# Patient Record
Sex: FEMALE | Race: WHITE | Employment: FULL TIME | URBAN - METROPOLITAN AREA
[De-identification: names, ages, dates, MRNs, and addresses within clinical notes are randomized per-mention and may not be internally consistent; named-entity substitution may affect disease eponyms.]

---

## 2017-01-18 RX ORDER — METOPROLOL SUCCINATE 25 MG/1
TABLET, EXTENDED RELEASE ORAL
Qty: 30 TAB | Refills: 0 | Status: SHIPPED | OUTPATIENT
Start: 2017-01-18 | End: 2017-02-20 | Stop reason: SDUPTHER

## 2017-01-18 NOTE — TELEPHONE ENCOUNTER
Changing to Dr. Sebastian Valverde, has appt Fri 1-20-17. Further refills by Dr. Sebastian Valverde at that time.

## 2017-01-20 ENCOUNTER — OFFICE VISIT (OUTPATIENT)
Dept: CARDIOLOGY CLINIC | Age: 52
End: 2017-01-20

## 2017-01-20 VITALS
HEIGHT: 63 IN | WEIGHT: 217.8 LBS | BODY MASS INDEX: 38.59 KG/M2 | DIASTOLIC BLOOD PRESSURE: 70 MMHG | SYSTOLIC BLOOD PRESSURE: 126 MMHG | OXYGEN SATURATION: 93 % | HEART RATE: 74 BPM | RESPIRATION RATE: 18 BRPM

## 2017-01-20 DIAGNOSIS — E78.2 MIXED HYPERLIPIDEMIA: ICD-10-CM

## 2017-01-20 DIAGNOSIS — I10 ESSENTIAL HYPERTENSION, BENIGN: ICD-10-CM

## 2017-01-20 DIAGNOSIS — I25.9 CHRONIC ISCHEMIC HEART DISEASE: Primary | ICD-10-CM

## 2017-01-20 DIAGNOSIS — Z95.1 S/P CABG X 4: ICD-10-CM

## 2017-01-20 DIAGNOSIS — I25.119 ATHEROSCLEROSIS OF NATIVE CORONARY ARTERY OF NATIVE HEART WITH ANGINA PECTORIS (HCC): ICD-10-CM

## 2017-01-20 DIAGNOSIS — E11.65 TYPE 2 DIABETES MELLITUS WITH HYPERGLYCEMIA, UNSPECIFIED LONG TERM INSULIN USE STATUS: ICD-10-CM

## 2017-01-20 NOTE — MR AVS SNAPSHOT
Visit Information Date & Time Provider Department Dept. Phone Encounter #  
 1/20/2017  3:30 PM Mikki Mckenna, [address] [place] [address] [facility] [id number] Your Appointments 1/25/2017  3:30 PM  
STRESS ECHOCARDIOGRAMS with STRES[facility], [facility] Lakisha Cerrato) Appt Note: Per Dr Luigi Brown $0CP REM 5'3\", 217. 12LBS. ECHO TTE STRESS EXRCSE COMP W OR WO CONTR [49652 Custom] [address]  
[phone] [address] [place] [address] Upcoming Health Maintenance Date Due Hepatitis C Screening 1965 FOOT EXAM Q1 7/7/1974 EYE EXAM RETINAL OR DILATED Q1 7/7/1974 Pneumococcal 19-64 Medium Risk (1 of 1 - PPSV23) 7/7/1983 DTaP/Tdap/Td series (1 - Tdap) 7/7/1985 PAP AKA CERVICAL CYTOLOGY 7/7/1985 BREAST CANCER SCRN MAMMOGRAM 7/7/2014 FOBT Q 1 YEAR AGE 50-75 7/7/2014 INFLUENZA AGE 9 TO ADULT 8/1/2016 HEMOGLOBIN A1C Q6M 10/14/2016 MICROALBUMIN Q1 1/20/2017 LIPID PANEL Q1 4/14/2017 Allergies as of 1/20/2017  Review Complete On: 1/20/2017 By: Sujata Phillips NP No Known Allergies Current Immunizations  Never Reviewed No immunizations on file. Not reviewed this visit You Were Diagnosed With   
  
 Codes Comments Chronic ischemic heart disease    -  Primary ICD-10-CM: I25.9 ICD-9-CM: 414.9 Mixed hyperlipidemia     ICD-10-CM: E78.2 ICD-9-CM: 272.2 Atherosclerosis of native coronary artery of native heart with angina pectoris ([facility] [address]     ICD-10-CM: I25.119 ICD-9-CM: 414.01, 413.9 Essential hypertension, benign     ICD-10-CM: I10 
ICD-9-CM: 401.1 Type 2 diabetes mellitus with hyperglycemia, unspecified long term insulin use status (HCC)     ICD-10-CM: E11.65 ICD-9-CM: 250.00 S/P CABG x 4     ICD-10-CM: Z95.1 ICD-9-CM: V45.81 Vitals BP Pulse Resp Height(growth percentile) Weight(growth percentile) SpO2 126/70 (BP 1 Location: Right arm, BP Patient Position: Sitting) 74 18 5' 3\" (1.6 m) 217 lb 12.8 oz (98.8 kg) 93% BMI Smoking Status 38.58 kg/m2 Never Smoker Vitals History BMI and BSA Data Body Mass Index Body Surface Area 38.58 kg/m 2 2.1 m 2 Preferred Pharmacy Pharmacy Name Phone [facility] [address] [place] [address]  Ne, [address] [place] [address]. [phone] Your Updated Medication List  
  
   
This list is accurate as of: 1/20/17  4:04 PM.  Always use your most recent med list.  
  
  
  
  
  insulin pump Misc Commonly known as:  PATIENT SUPPLIED  
by SubCUTAneous route as needed. amLODIPine 5 mg tablet Commonly known as:  David Alstrom TAKE ONE TABLET BY MOUTH EVERY DAY  
  
 aspirin 325 mg tablet Commonly known as:  ASPIRIN Take 325 mg by mouth daily. atorvastatin 20 mg tablet Commonly known as:  LIPITOR  
TAKE ONE TABLET BY MOUTH EVERY NIGHT AT BEDTIME  
  
 FREESTYLE TEST strip Generic drug:  glucose blood VI test strips  
  
 isosorbide mononitrate ER 30 mg tablet Commonly known as:  IMDUR  
TAKE ONE-HALF TABLET BY MOUTH DAILY  
  
 lisinopril 5 mg tablet Commonly known as:  Mariza Loss Take 5 mg by mouth daily. methIMAzole 10 mg tablet Commonly known as:  TAPAZOLE Take 10 mg by mouth daily. metoprolol succinate 25 mg XL tablet Commonly known as:  TOPROL-XL  
TAKE ONE TABLET BY MOUTH DAILY NovoLOG 100 unit/mL injection Generic drug:  insulin aspart We Performed the Following AMB POC EKG ROUTINE W/ 12 LEADS, INTER & REP [33918 CPT(R)] To-Do List   
 01/24/2017 ECHO:  ECHO TTE STRESS EXRCSE COMP W OR WO CONTR Introducing [facility]! [place] [employer] introduces io[employer] patient portal. Now you can access parts of your medical record, email your doctor's office, and request medication refills online.    
 
1. In your internet browser, go to [email or web address] 2. Click on the First Time User? Click Here link in the Sign In box. You will see the New Member Sign Up page. 3. Enter your [employer] Access Code exactly as it appears below. You will not need to use this code after youve completed the sign-up process. If you do not sign up before the expiration date, you must request a new code. · [employer] Access Code: [email or web address] Expires: 4/20/2017  3:23 PM 
 
4. Enter the last four digits of your Social Security Number (xxxx) and Date of Birth (mm/dd/yyyy) as indicated and click Submit. You will be taken to the next sign-up page. 5. Create a Zervantt ID. This will be your [employer] login ID and cannot be changed, so think of one that is secure and easy to remember. 6. Create a [employer] password. You can change your password at any time. 7. Enter your Password Reset Question and Answer. This can be used at a later time if you forget your password. 8. Enter your e-mail address. You will receive e-mail notification when new information is available in [address] 9. Click Sign Up. You can now view and download portions of your medical record. 10. Click the Download Summary menu link to download a portable copy of your medical information. If you have questions, please visit the Frequently Asked Questions section of the [employer] website. Remember, [employer] is NOT to be used for urgent needs. For medical emergencies, dial 911. Now available from your iPhone and Android! Please provide this summary of care documentation to your next provider. Your primary care clinician is listed as APOLINAR Coats 47. If you have any questions after today's visit, please call [phone].

## 2017-01-20 NOTE — PROGRESS NOTES
Redd Shepherd NP  Subjective/HPI:     Yonny Garvin is a 45 y.o. female is here for routine f/u. The patient denies resting chest pain/resting shortness of breath, orthopnea, PND, LE edema, palpitations, syncope, presyncope or fatigue. She is a 62-year-old female with a history of CABG ×4 in 2004, insulin-dependent diabetes, hypertension, hyperlipidemia. She reports for her usual follow-up and review of systems she does report some dyspnea on exertion with walking and hiking. Last evaluation for ischemia 2011. PCP Provider  Rosita Ceja MD  Past Medical History   Diagnosis Date    Chronic kidney disease      elevated potassiums    Coronary atherosclerosis of native coronary artery     Coronary atherosclerosis of native coronary artery     Diabetes ([address]     Essential hypertension     Essential hypertension, benign     Heart failure ([address]      bypass 6yrs ago    Hypertension     Morbid obesity ([address]     Nausea & vomiting     Thyroid disease       Past Surgical History   Procedure Laterality Date    Hx tubal ligation      Hx orthopaedic       shoulder surg    Pr cabg, artery-vein, four      Hx heart catheterization       No Known Allergies   History reviewed. No pertinent family history. Current Outpatient Prescriptions   Medication Sig    metoprolol succinate (TOPROL-XL) 25 mg XL tablet TAKE ONE TABLET BY MOUTH DAILY    isosorbide mononitrate ER (IMDUR) 30 mg tablet TAKE ONE-HALF TABLET BY MOUTH DAILY    atorvastatin (LIPITOR) 20 mg tablet TAKE ONE TABLET BY MOUTH EVERY NIGHT AT BEDTIME    FREESTYLE TEST strip     NOVOLOG 100 unit/mL injection     amLODIPine (NORVASC) 5 mg tablet TAKE ONE TABLET BY MOUTH EVERY DAY     insulin pump (PATIENT SUPPLIED) Misc by SubCUTAneous route as needed.  aspirin (ASPIRIN) 325 mg tablet Take 325 mg by mouth daily.  methimazole (TAPAZOLE) 10 mg tablet Take 10 mg by mouth daily.     lisinopril (PRINIVIL, ZESTRIL) 5 mg tablet Take 5 mg by mouth daily. No current facility-administered medications for this visit. Vitals:    01/20/17 1527 01/20/17 1534   BP: 130/70 126/70   Pulse: 74    Resp: 18    SpO2: 93%    Weight: 217 lb 12.8 oz (98.8 kg)    Height: 5' 3\" (1.6 m)      Social History     Social History    Marital status:      Spouse name: N/A    Number of children: N/A    Years of education: N/A     Occupational History    Not on file. Social History Main Topics    Smoking status: Never Smoker    Smokeless tobacco: Never Used    Alcohol use 0.0 oz/week     0 Standard drinks or equivalent per week      Comment: occaisionally    Drug use: Not on file    Sexual activity: Not on file     Other Topics Concern    Not on file     Social History Narrative       I have reviewed the nurses notes, vitals, problem list, allergy list, medical history, family, social history and medications. Review of Symptoms:    General: Pt denies excessive weight gain or loss. Pt is able to conduct ADL's  HEENT: Denies blurred vision, headaches, epistaxis and difficulty swallowing. Respiratory: Denies shortness of breath, + denies MOTTA, wheezing or stridor. Cardiovascular: Denies precordial pain, palpitations, edema or PND  Gastrointestinal: Denies poor appetite, indigestion, abdominal pain or blood in stool  Musculoskeletal: Denies pain or swelling from muscles or joints  Neurologic: Denies tremor, paresthesias, or sensory motor disturbance  Skin: Denies rash, itching or texture change. Physical Exam:      General: Well developed, in no acute distress, cooperative and alert  HEENT: No carotid bruits, no JVD, trach is midline. Neck Supple, PEERL, EOM intact. Heart:  Normal S1/S2 negative S3 or S4. Regular, no murmur, gallop or rub.   Respiratory: Clear bilaterally x 4, no wheezing or rales  Abdomen:   Soft, non-tender, no masses, bowel sounds are active.   Extremities:  No edema, normal cap refill, no cyanosis, atraumatic.    Neuro: A&Ox3, speech clear, gait stable. Skin: Skin color is normal. No rashes or lesions. Non diaphoretic  Vascular: 2+ pulses symmetric in all extremities    Cardiographics    ECG: Sinus rhythm  No results found for this or any previous visit. Cardiology Labs:  No results found for: CHOL, CHOLX, CHLST, [address], [id number], HDL, LDL, DLDL, LDLC, DLDLP, TGL, [facility], [id number], TRIGP, CHHD, CHHDX    Lab Results   Component Value Date/Time    Sodium 134 07/10/2010 03:34 AM    Potassium 5.3 07/10/2010 03:34 AM    Chloride 102 07/10/2010 03:34 AM    CO2 26 07/10/2010 03:34 AM    Anion gap 6 07/10/2010 03:34 AM    Glucose 300 07/10/2010 03:34 AM    BUN 30 07/10/2010 03:34 AM    Creatinine 1.5 07/10/2010 03:34 AM    BUN/Creatinine ratio 20 07/10/2010 03:34 AM    GFR est AA 48 07/10/2010 03:34 AM    GFR est non-AA 40 07/10/2010 03:34 AM    Calcium 8.4 07/10/2010 03:34 AM    Bilirubin, total 0.5 06/24/2010 10:30 AM    ALT 30 06/24/2010 10:30 AM    AST 14 06/24/2010 10:30 AM    Alk. phosphatase 196 06/24/2010 10:30 AM    Protein, total 7.4 06/24/2010 10:30 AM    Albumin 3.5 06/24/2010 10:30 AM    Globulin 3.9 06/24/2010 10:30 AM    A-G Ratio 0.9 06/24/2010 10:30 AM           Assessment:     Assessment:     [facility] was seen today for cholesterol problem.     Diagnoses and all orders for this visit:    Chronic ischemic heart disease  -     ECHO TTE STRESS EXRCSE COMP W OR WO CONTR; Future    Mixed hyperlipidemia  -     AMB POC EKG ROUTINE W/ 12 LEADS, INTER & REP  -     ECHO TTE STRESS EXRCSE COMP W OR WO CONTR; Future    Atherosclerosis of native coronary artery of native heart with angina pectoris (HCC)  -     ECHO TTE STRESS EXRCSE COMP W OR WO CONTR; Future    Essential hypertension, benign  -     ECHO TTE STRESS EXRCSE COMP W OR WO CONTR; Future    Type 2 diabetes mellitus with hyperglycemia, unspecified long term insulin use status (HCC)  -     ECHO TTE STRESS EXRCSE COMP W OR WO CONTR; Future    S/P CABG x 4  -     ECHO TTE STRESS EXRCSE COMP W OR WO CONTR; Future        ICD-10-CM ICD-9-CM    1. Chronic ischemic heart disease I25.9 414.9 ECHO TTE STRESS EXRCSE COMP W OR WO CONTR   2. Mixed hyperlipidemia E78.2 272.2 AMB POC EKG ROUTINE W/ 12 LEADS, INTER & REP      ECHO TTE STRESS EXRCSE COMP W OR WO CONTR   3. Atherosclerosis of native coronary artery of native heart with angina pectoris ([facility] [address] I25.119 414.01 ECHO TTE STRESS EXRCSE COMP W OR WO CONTR     413.9    4. Essential hypertension, benign I10 401.1 ECHO TTE STRESS EXRCSE COMP W OR WO CONTR   5. Type 2 diabetes mellitus with hyperglycemia, unspecified long term insulin use status (HCC) E11.65 250.00 ECHO TTE STRESS EXRCSE COMP W OR WO CONTR   6. S/P CABG x 4 Z95.1 V45.81 ECHO TTE STRESS EXRCSE COMP W OR WO CONTR     Orders Placed This Encounter    AMB POC EKG ROUTINE W/ 12 LEADS, INTER & REP     Order Specific Question:   Reason for Exam:     Answer:   routine    ECHO TTE STRESS EXRCSE COMP W OR WO CONTR     Standing Status:   Future     Standing Expiration Date:   7/20/2017     Order Specific Question:   Reason for Exam:     Answer:   MOTTA     Order Specific Question:   Contrast Enhancement (Bubble Study, Definity, Optison) may be used if criteria listed in established evidence-based protocol has been identified. Answer:   Yes        Plan:     1. Atherosclerotic heart disease/CABGx 4  2004: Mild dyspnea on exertion will rule out ischemia with stress echo. Patient is on triple antianginal therapy. 2.  Hypertension: Controlled with current therapy  3. Hyperlipidemia: Labs followed by endocrinology LDL at target continue statin therapy  Follow-up 6 months unless abnormal stress test.  Encourage patient to focus on diet exercise and weight loss.     Aan Cornejo MD

## 2017-01-25 ENCOUNTER — CLINICAL SUPPORT (OUTPATIENT)
Dept: CARDIOLOGY CLINIC | Age: 52
End: 2017-01-25

## 2017-01-25 DIAGNOSIS — I10 ESSENTIAL HYPERTENSION, BENIGN: ICD-10-CM

## 2017-01-25 DIAGNOSIS — E78.2 MIXED HYPERLIPIDEMIA: ICD-10-CM

## 2017-01-25 DIAGNOSIS — I25.119 ATHEROSCLEROSIS OF NATIVE CORONARY ARTERY OF NATIVE HEART WITH ANGINA PECTORIS (HCC): ICD-10-CM

## 2017-01-25 DIAGNOSIS — Z95.1 S/P CABG X 4: ICD-10-CM

## 2017-01-25 DIAGNOSIS — R07.9 CHEST PAIN, UNSPECIFIED: ICD-10-CM

## 2017-01-25 DIAGNOSIS — I25.9 CHRONIC ISCHEMIC HEART DISEASE: ICD-10-CM

## 2017-01-25 DIAGNOSIS — E11.65 TYPE 2 DIABETES MELLITUS WITH HYPERGLYCEMIA, UNSPECIFIED LONG TERM INSULIN USE STATUS: ICD-10-CM

## 2017-02-03 ENCOUNTER — TELEPHONE (OUTPATIENT)
Dept: CARDIOLOGY CLINIC | Age: 52
End: 2017-02-03

## 2017-02-03 NOTE — PROGRESS NOTES
Please let her know her stress test is normal. If she is having concerning symptoms she would like to discuss she can f/u with Dr Raymundo Chacon

## 2017-02-03 NOTE — TELEPHONE ENCOUNTER
----- Message from Adam Boateng NP sent at 2/3/2017  9:32 AM EST -----  Please let her know her stress test is normal. If she is having concerning symptoms she would like to discuss she can f/u with Dr Janey Sellers

## 2017-02-03 NOTE — TELEPHONE ENCOUNTER
Notes Recorded by Abigail Huffman LPN on 5/1/4500 at 21:98 AM  Verified patient with two identifiers.  Patient aware stress test is normal. If she is having concerning symptoms she would like to discuss she can f/u with Dr Maribell Pool.  She verbalized understanding

## 2017-02-03 NOTE — PROGRESS NOTES
Verified patient with two identifiers. Patient aware stress test is normal. If she is having concerning symptoms she would like to discuss she can f/u with Dr Nancy Hays. She verbalized understanding.

## 2017-02-20 RX ORDER — ATORVASTATIN CALCIUM 20 MG/1
TABLET, FILM COATED ORAL
Qty: 90 TAB | Refills: 0 | Status: SHIPPED | OUTPATIENT
Start: 2017-02-20 | End: 2017-06-06 | Stop reason: SDUPTHER

## 2017-03-31 ENCOUNTER — TELEPHONE (OUTPATIENT)
Dept: CARDIOLOGY CLINIC | Age: 52
End: 2017-03-31

## 2017-03-31 NOTE — TELEPHONE ENCOUNTER
We received fax from [facility] stating patient will be having removal of teeth, bone graft and or dental implant under local anesthesia. Please advise of the following. 1. Any medical contraindications or recommendations with anticipated dental treatment? 2. Need for prophylactic antibiotics? 3. Does she need to be off aspirin prior to extractions?

## 2017-03-31 NOTE — TELEPHONE ENCOUNTER
She is cleared as low risk for cardiac complications, no prophylactic abx and can hold ASA 5-7 days before if surgeon prefers

## 2017-06-06 RX ORDER — ATORVASTATIN CALCIUM 20 MG/1
TABLET, FILM COATED ORAL
Qty: 30 TAB | Refills: 6 | Status: SHIPPED | OUTPATIENT
Start: 2017-06-06 | End: 2017-08-23 | Stop reason: SDUPTHER

## 2017-08-23 ENCOUNTER — OFFICE VISIT (OUTPATIENT)
Dept: CARDIOLOGY CLINIC | Age: 52
End: 2017-08-23

## 2017-08-23 VITALS
SYSTOLIC BLOOD PRESSURE: 102 MMHG | BODY MASS INDEX: 38.7 KG/M2 | HEIGHT: 63 IN | HEART RATE: 66 BPM | RESPIRATION RATE: 16 BRPM | WEIGHT: 218.4 LBS | OXYGEN SATURATION: 98 % | DIASTOLIC BLOOD PRESSURE: 60 MMHG

## 2017-08-23 DIAGNOSIS — I25.10 ATHEROSCLEROSIS OF NATIVE CORONARY ARTERY OF NATIVE HEART WITHOUT ANGINA PECTORIS: ICD-10-CM

## 2017-08-23 DIAGNOSIS — I25.9 CHRONIC ISCHEMIC HEART DISEASE: Primary | ICD-10-CM

## 2017-08-23 DIAGNOSIS — E11.8 TYPE 2 DIABETES MELLITUS WITH COMPLICATION, UNSPECIFIED LONG TERM INSULIN USE STATUS: ICD-10-CM

## 2017-08-23 DIAGNOSIS — I10 ESSENTIAL HYPERTENSION, BENIGN: ICD-10-CM

## 2017-08-23 DIAGNOSIS — E78.2 MIXED HYPERLIPIDEMIA: ICD-10-CM

## 2017-08-23 RX ORDER — METOPROLOL SUCCINATE 25 MG/1
TABLET, EXTENDED RELEASE ORAL
Qty: 90 TAB | Refills: 3 | Status: SHIPPED | OUTPATIENT
Start: 2017-08-23 | End: 2018-08-27 | Stop reason: SDUPTHER

## 2017-08-23 RX ORDER — ACETAMINOPHEN AND CODEINE PHOSPHATE 300; 30 MG/1; MG/1
TABLET ORAL
COMMUNITY
Start: 2017-07-04 | End: 2019-11-18

## 2017-08-23 RX ORDER — ISOSORBIDE MONONITRATE 30 MG/1
TABLET, EXTENDED RELEASE ORAL
Qty: 45 TAB | Refills: 3 | Status: SHIPPED | OUTPATIENT
Start: 2017-08-23 | End: 2018-09-13 | Stop reason: SDUPTHER

## 2017-08-23 RX ORDER — ATORVASTATIN CALCIUM 20 MG/1
TABLET, FILM COATED ORAL
Qty: 90 TAB | Refills: 3 | Status: SHIPPED | OUTPATIENT
Start: 2017-08-23

## 2017-08-23 RX ORDER — AMLODIPINE BESYLATE 5 MG/1
TABLET ORAL
Qty: 90 TAB | Refills: 3 | Status: SHIPPED | OUTPATIENT
Start: 2017-08-23 | End: 2018-08-27 | Stop reason: SDUPTHER

## 2017-08-23 NOTE — PROGRESS NOTES
Subjective/HPI:     Mariana Rodriguez is a 47 y.o. female is here for f/u appt. She reports she still will get winded when she goes hiking. No worse. Doesn't notice it with normal level activity. The patient denies chest pain, orthopnea, PND, LE edema, palpitations, syncope, presyncope or fatigue. PCP Provider  Shana Lomeli MD  Past Medical History:   Diagnosis Date    Chronic kidney disease     elevated potassiums    Coronary atherosclerosis of native coronary artery     Coronary atherosclerosis of native coronary artery     Diabetes ([facility] [address]     Essential hypertension     Essential hypertension, benign     Heart failure ([facility] [address]     bypass 6yrs ago    Hypertension     Morbid obesity ([facility] [address]     Nausea & vomiting     Thyroid disease       Past Surgical History:   Procedure Laterality Date    CABG, ARTERY-VEIN, FOUR      HX HEART CATHETERIZATION      HX ORTHOPAEDIC      shoulder surg    HX TUBAL LIGATION       No Known Allergies   No family history on file. Current Outpatient Prescriptions   Medication Sig    isosorbide mononitrate ER (IMDUR) 30 mg tablet TAKE ONE-HALF TABLET BY MOUTH DAILY    atorvastatin (LIPITOR) 20 mg tablet TAKE ONE TABLET BY MOUTH EVERY NIGHT AT BEDTIME    metoprolol succinate (TOPROL-XL) 25 mg XL tablet TAKE ONE TABLET BY MOUTH DAILY, FOR MORE REFILLS CONTACT .  amLODIPine (NORVASC) 5 mg tablet TAKE ONE TABLET BY MOUTH EVERY DAY    FREESTYLE TEST strip     NOVOLOG 100 unit/mL injection      insulin pump (PATIENT SUPPLIED) Misc by SubCUTAneous route as needed.  aspirin (ASPIRIN) 325 mg tablet Take 325 mg by mouth daily.  methimazole (TAPAZOLE) 10 mg tablet Take 10 mg by mouth daily.  lisinopril (PRINIVIL, ZESTRIL) 5 mg tablet Take 5 mg by mouth daily.  acetaminophen-codeine (TYLENOL #3) 300-30 mg per tablet      No current facility-administered medications for this visit.        Vitals:    08/23/17 1136 08/23/17 1143   BP: 96/64 102/60   Pulse: 66    Resp: 16    SpO2: 98%    Weight: 218 lb 6.4 oz (99.1 kg)    Height: 5' 3\" (1.6 m)      Social History     Social History    Marital status:      Spouse name: N/A    Number of children: N/A    Years of education: N/A     Occupational History    Not on file. Social History Main Topics    Smoking status: Never Smoker    Smokeless tobacco: Never Used    Alcohol use 0.0 oz/week     0 Standard drinks or equivalent per week      Comment: occaisionally    Drug use: Not on file    Sexual activity: Not on file     Other Topics Concern    Not on file     Social History Narrative       I have reviewed the nurses notes, vitals, problem list, allergy list, medical history, family, social history and medications. Review of Symptoms:    General: Pt denies excessive weight gain or loss. Pt is able to conduct ADL's  HEENT: Denies blurred vision, headaches, epistaxis and difficulty swallowing. Respiratory: Denies worsening shortness of breath, MOTTA, wheezing or stridor. Cardiovascular: Denies precordial pain, palpitations, edema or PND  Gastrointestinal: Denies poor appetite, indigestion, abdominal pain or blood in stool  Urinary: Denies dysuria, pyuria  Musculoskeletal: Denies pain or swelling from muscles or joints  Neurologic: Denies tremor, paresthesias, or sensory motor disturbance  Skin: Denies rash, itching or texture change. Psych: Denies depression        Physical Exam:      General: Well developed, in no acute distress, cooperative and alert  HEENT: No carotid bruits, no JVD, trach is midline. Neck Supple, PEERL, EOM intact. Heart:  Normal S1/S2 negative S3 or S4. Regular, no murmur, gallop or rub.   Respiratory: Clear bilaterally x 4, no wheezing or rales  Abdomen:   Soft, non-tender, no masses, bowel sounds are active.   Extremities:  No edema, normal cap refill, no cyanosis, atraumatic. Neuro: A&Ox3, speech clear, gait stable.    Skin: Skin color is normal. No rashes or lesions. Non diaphoretic  Vascular: 2+ pulses symmetric in all extremities    Cardiographics    ECG: Sinus  Bradycardia HR 59  Nonspecific T-abnormality. Low voltage         Cardiology Labs:  No results found for: CHOL, CHOLX, CHLST, CHOLV, 349774, HDL, LDL, LDLC, DLDLP, TGLX, TRIGL, TRIGP, CHHD, CHHDX    Lab Results   Component Value Date/Time    Sodium 134 07/10/2010 03:34 AM    Potassium 5.3 07/10/2010 03:34 AM    Chloride 102 07/10/2010 03:34 AM    CO2 26 07/10/2010 03:34 AM    Anion gap 6 07/10/2010 03:34 AM    Glucose 300 07/10/2010 03:34 AM    BUN 30 07/10/2010 03:34 AM    Creatinine 1.5 07/10/2010 03:34 AM    BUN/Creatinine ratio 20 07/10/2010 03:34 AM    GFR est AA 48 07/10/2010 03:34 AM    GFR est non-AA 40 07/10/2010 03:34 AM    Calcium 8.4 07/10/2010 03:34 AM    AST (SGOT) 14 06/24/2010 10:30 AM    Alk. phosphatase 196 06/24/2010 10:30 AM    Protein, total 7.4 06/24/2010 10:30 AM    Albumin 3.5 06/24/2010 10:30 AM    Globulin 3.9 06/24/2010 10:30 AM    A-G Ratio 0.9 06/24/2010 10:30 AM    ALT (SGPT) 30 06/24/2010 10:30 AM           Assessment:     Assessment:     Diagnoses and all orders for this visit:    1. Chronic ischemic heart disease  -     isosorbide mononitrate ER (IMDUR) 30 mg tablet; TAKE ONE-HALF TABLET BY MOUTH DAILY  -     metoprolol succinate (TOPROL-XL) 25 mg XL tablet; TAKE ONE TABLET BY MOUTH DAILY, FOR MORE REFILLS CONTACT . 2. Atherosclerosis of native coronary artery of native heart without angina pectoris  -     metoprolol succinate (TOPROL-XL) 25 mg XL tablet; TAKE ONE TABLET BY MOUTH DAILY, FOR MORE REFILLS CONTACT . 3. Mixed hyperlipidemia  -     AMB POC EKG ROUTINE W/ 12 LEADS, INTER & REP  -     atorvastatin (LIPITOR) 20 mg tablet; TAKE ONE TABLET BY MOUTH EVERY NIGHT AT BEDTIME    4. Essential hypertension, benign  -     amLODIPine (NORVASC) 5 mg tablet; TAKE ONE TABLET BY MOUTH EVERY DAY    5.  Type 2 diabetes mellitus with complication, unspecified long term insulin use status        ICD-10-CM ICD-9-CM    1. Chronic ischemic heart disease I25.9 414.9 isosorbide mononitrate ER (IMDUR) 30 mg tablet      metoprolol succinate (TOPROL-XL) 25 mg XL tablet   2. Atherosclerosis of native coronary artery of native heart without angina pectoris I25.10 414.01 metoprolol succinate (TOPROL-XL) 25 mg XL tablet   3. Mixed hyperlipidemia E78.2 272.2 AMB POC EKG ROUTINE W/ 12 LEADS, INTER & REP      atorvastatin (LIPITOR) 20 mg tablet   4. Essential hypertension, benign I10 401.1 amLODIPine (NORVASC) 5 mg tablet   5. Type 2 diabetes mellitus with complication, unspecified long term insulin use status E11.8 250.90      Orders Placed This Encounter    AMB POC EKG ROUTINE W/ 12 LEADS, INTER & REP     Order Specific Question:   Reason for Exam:     Answer:   Routine    acetaminophen-codeine (TYLENOL #3) 300-30 mg per tablet    isosorbide mononitrate ER (IMDUR) 30 mg tablet     Sig: TAKE ONE-HALF TABLET BY MOUTH DAILY     Dispense:  45 Tab     Refill:  3    atorvastatin (LIPITOR) 20 mg tablet     Sig: TAKE ONE TABLET BY MOUTH EVERY NIGHT AT BEDTIME     Dispense:  90 Tab     Refill:  3    metoprolol succinate (TOPROL-XL) 25 mg XL tablet     Sig: TAKE ONE TABLET BY MOUTH DAILY, FOR MORE REFILLS CONTACT . Dispense:  90 Tab     Refill:  3    amLODIPine (NORVASC) 5 mg tablet     Sig: TAKE ONE TABLET BY MOUTH EVERY DAY     Dispense:  90 Tab     Refill:  3        Plan:   1. Atherosclerotic heart disease/CABGx 4  2004: Mild dyspnea on exertion at her baseline with neg stress echo. Patient is on triple antianginal therapy. Call back should it worsen to make an appt to discuss  2. Hypertension: Lower today, pt asymptomatic. Continue with current therapy. Encourage patient to focus on diet exercise and weight loss.   3.  Hyperlipidemia: Labs followed by endocrinology LDL at target continue statin therapy  Follow-up 6 months     Maik Salamanca MD

## 2017-08-23 NOTE — PROGRESS NOTES
Chief Complaint   Patient presents with    Cholesterol Problem     6 mo f/u    Hypertension     \"    Coronary Artery Disease     \"

## 2018-03-02 ENCOUNTER — OFFICE VISIT (OUTPATIENT)
Dept: CARDIOLOGY CLINIC | Age: 53
End: 2018-03-02

## 2018-03-02 VITALS
HEIGHT: 63 IN | BODY MASS INDEX: 39.51 KG/M2 | RESPIRATION RATE: 16 BRPM | OXYGEN SATURATION: 97 % | HEART RATE: 70 BPM | WEIGHT: 223 LBS | SYSTOLIC BLOOD PRESSURE: 140 MMHG | DIASTOLIC BLOOD PRESSURE: 70 MMHG

## 2018-03-02 DIAGNOSIS — Z95.1 S/P CABG X 4: ICD-10-CM

## 2018-03-02 DIAGNOSIS — I25.10 ATHEROSCLEROSIS OF NATIVE CORONARY ARTERY OF NATIVE HEART WITHOUT ANGINA PECTORIS: Primary | ICD-10-CM

## 2018-03-02 DIAGNOSIS — E78.2 MIXED HYPERLIPIDEMIA: ICD-10-CM

## 2018-03-02 DIAGNOSIS — I10 ESSENTIAL HYPERTENSION, BENIGN: ICD-10-CM

## 2018-03-02 RX ORDER — GLUCOSAMINE SULFATE 1500 MG
POWDER IN PACKET (EA) ORAL DAILY
COMMUNITY

## 2018-03-02 NOTE — PROGRESS NOTES
Sarath Kiran MD          NAME:  Amber Flaherty   :   1965   MRN:   [id number]   PCP:  None           Subjective: The patient is a 47y.o. year old female  who returns for a routine follow-up. Since the last visit, patient reports no change in exercise tolerance, chest pain, edema, medication intolerance, palpitations, shortness of breath, PND/orthopnea wheezing, sputum, syncope, dizziness or light headedness. Doing well. Past Medical History:   Diagnosis Date    Chronic kidney disease     elevated potassiums    Coronary atherosclerosis of native coronary artery     Coronary atherosclerosis of native coronary artery     Diabetes ([facility]     Essential hypertension     Essential hypertension, benign     Heart failure (HCC)     bypass 6yrs ago    Hypertension     Morbid obesity ([facility]     Nausea & vomiting     Thyroid disease         ICD-10-CM ICD-9-CM    1. Atherosclerosis of native coronary artery of native heart without angina pectoris I25.10 414.01    2. Mixed hyperlipidemia E78.2 272.2 cholecalciferol (VITAMIN D3) 1,000 unit cap      AMB POC EKG ROUTINE W/ 12 LEADS, INTER & REP   3. S/P CABG x 4 Z95.1 V45.81    4. Essential hypertension, benign I10 401.1       Social History   Substance Use Topics    Smoking status: Never Smoker    Smokeless tobacco: Never Used    Alcohol use 0.0 oz/week     0 Standard drinks or equivalent per week      Comment: occaisionally      No family history on file. Review of Systems  Cardiovascular: Negative except as noted in HPI      Objective:       Vitals:    18 1513   BP: 140/70   Pulse: 70   Resp: 16   SpO2: 97%   Weight: 223 lb (101.2 kg)   Height: 5' 3\" (1.6 m)    Body mass index is 39.5 kg/(m^2). General PE  Mental Status - Alert. General Appearance - Not in acute distress. Chest and Lung Exam   Inspection: Accessory muscles - No use of accessory muscles in breathing.   Auscultation:   Breath sounds: - Normal.    Cardiovascular   Inspection: Jugular vein - Bilateral - Inspection Normal.  Palpation/Percussion:   Apical Impulse: - Normal.  Auscultation: Rhythm - Regular. Heart Sounds - S1 WNL and S2 WNL. No S3 or S4. Murmurs & Other Heart Sounds: Auscultation of the heart reveals - No Murmurs. Peripheral Vascular   Upper Extremity: Inspection - Bilateral - No Cyanotic nailbeds or Digital clubbing. Lower Extremity:   Palpation: Edema - Bilateral - No edema. Data Review:     EKG -  EKG: unchanged from previous tracings, normal sinus rhythm, nonspecific ST and T waves changes. LABS- @brieflabs@      Allergies reviewed  No Known Allergies    Medications reviewed  Current Outpatient Prescriptions   Medication Sig    cholecalciferol (VITAMIN D3) 1,000 unit cap Take  by mouth daily.  isosorbide mononitrate ER (IMDUR) 30 mg tablet TAKE ONE-HALF TABLET BY MOUTH DAILY    atorvastatin (LIPITOR) 20 mg tablet TAKE ONE TABLET BY MOUTH EVERY NIGHT AT BEDTIME    metoprolol succinate (TOPROL-XL) 25 mg XL tablet TAKE ONE TABLET BY MOUTH DAILY, FOR MORE REFILLS CONTACT .  amLODIPine (NORVASC) 5 mg tablet TAKE ONE TABLET BY MOUTH EVERY DAY    FREESTYLE TEST strip     NOVOLOG 100 unit/mL injection      insulin pump (PATIENT SUPPLIED) Misc by SubCUTAneous route as needed.  aspirin (ASPIRIN) 325 mg tablet Take 325 mg by mouth daily.  methimazole (TAPAZOLE) 10 mg tablet Take 10 mg by mouth daily.  lisinopril (PRINIVIL, ZESTRIL) 5 mg tablet Take 5 mg by mouth daily.  acetaminophen-codeine (TYLENOL #3) 300-30 mg per tablet      No current facility-administered medications for this visit. Assessment:       ICD-10-CM ICD-9-CM    1. Atherosclerosis of native coronary artery of native heart without angina pectoris I25.10 414.01    2. Mixed hyperlipidemia E78.2 272.2 cholecalciferol (VITAMIN D3) 1,000 unit cap      AMB POC EKG ROUTINE W/ 12 LEADS, INTER & REP   3.  S/P CABG x 4 Z95.1 V45.81    4. Essential hypertension, benign I10 401.1         Orders Placed This Encounter    AMB POC EKG ROUTINE W/ 12 LEADS, INTER & REP     Order Specific Question:   Reason for Exam:     Answer:   routine    cholecalciferol (VITAMIN D3) 1,000 unit cap     Sig: Take  by mouth daily. Plan:     No angina. MOTTA unchanged. 130/70 at home. Lipids per endo. EKG OK.   F/U 6 mo    Gildardo Farrell MD

## 2018-03-02 NOTE — PROGRESS NOTES
1. Have you been to the ER, urgent care clinic since your last visit? Hospitalized since your last visit? No    2. Have you seen or consulted any other health care providers outside of the [address] [place] [address] since your last visit? Include any pap smears or colon screening. Yes Dr Arndt Shall    Patient C/O fatigue and SOB with activity.

## 2018-08-27 DIAGNOSIS — I10 ESSENTIAL HYPERTENSION, BENIGN: ICD-10-CM

## 2018-08-27 DIAGNOSIS — I25.10 ATHEROSCLEROSIS OF NATIVE CORONARY ARTERY OF NATIVE HEART WITHOUT ANGINA PECTORIS: ICD-10-CM

## 2018-08-27 DIAGNOSIS — I25.9 CHRONIC ISCHEMIC HEART DISEASE: ICD-10-CM

## 2018-08-27 RX ORDER — AMLODIPINE BESYLATE 5 MG/1
TABLET ORAL
Qty: 90 TAB | Refills: 0 | Status: SHIPPED | OUTPATIENT
Start: 2018-08-27 | End: 2018-11-22 | Stop reason: SDUPTHER

## 2018-08-27 RX ORDER — METOPROLOL SUCCINATE 25 MG/1
TABLET, EXTENDED RELEASE ORAL
Qty: 90 TAB | Refills: 0 | Status: SHIPPED | OUTPATIENT
Start: 2018-08-27 | End: 2018-11-22 | Stop reason: SDUPTHER

## 2018-09-13 DIAGNOSIS — I25.9 CHRONIC ISCHEMIC HEART DISEASE: ICD-10-CM

## 2018-09-13 RX ORDER — ISOSORBIDE MONONITRATE 30 MG/1
TABLET, EXTENDED RELEASE ORAL
Qty: 45 TAB | Refills: 3 | Status: SHIPPED | OUTPATIENT
Start: 2018-09-13 | End: 2019-09-05 | Stop reason: SDUPTHER

## 2018-11-19 ENCOUNTER — OFFICE VISIT (OUTPATIENT)
Dept: CARDIOLOGY CLINIC | Age: 53
End: 2018-11-19

## 2018-11-19 VITALS
HEART RATE: 75 BPM | BODY MASS INDEX: 37.53 KG/M2 | OXYGEN SATURATION: 97 % | DIASTOLIC BLOOD PRESSURE: 72 MMHG | SYSTOLIC BLOOD PRESSURE: 122 MMHG | HEIGHT: 63 IN | RESPIRATION RATE: 16 BRPM | WEIGHT: 211.8 LBS

## 2018-11-19 DIAGNOSIS — I25.10 CORONARY ARTERY DISEASE INVOLVING NATIVE CORONARY ARTERY OF NATIVE HEART WITHOUT ANGINA PECTORIS: ICD-10-CM

## 2018-11-19 DIAGNOSIS — E78.2 MIXED HYPERLIPIDEMIA: ICD-10-CM

## 2018-11-19 DIAGNOSIS — I10 ESSENTIAL HYPERTENSION, BENIGN: ICD-10-CM

## 2018-11-19 DIAGNOSIS — I25.9 CHRONIC ISCHEMIC HEART DISEASE: Primary | ICD-10-CM

## 2018-11-19 DIAGNOSIS — Z95.1 S/P CABG X 4: ICD-10-CM

## 2018-11-19 PROBLEM — E66.01 SEVERE OBESITY (HCC): Status: ACTIVE | Noted: 2018-11-19

## 2018-11-19 RX ORDER — INSULIN LISPRO 100 [IU]/ML
INJECTION, SOLUTION INTRAVENOUS; SUBCUTANEOUS
COMMUNITY
Start: 2018-10-27

## 2018-11-19 NOTE — PROGRESS NOTES
Chief Complaint   Patient presents with    Cholesterol Problem     6 month follow up     1. Have you been to the ER, urgent care clinic since your last visit? Hospitalized since your last visit? No    2. Have you seen or consulted any other health care providers outside of the [facility] since your last visit? Include any pap smears or colon screening.  No

## 2018-11-20 NOTE — PROGRESS NOTES
Cl Lehman MD          NAME:  Medhat Donovan   :   1965   MRN:   [id number]   PCP:  None           Subjective: The patient is a 46y.o. year old female  who returns for a routine follow-up. Since the last visit, patient reports no change in exercise tolerance, chest pain, edema, medication intolerance, palpitations, shortness of breath, PND/orthopnea wheezing, sputum, syncope, dizziness or light headedness. Doing well. Past Medical History:   Diagnosis Date    Chronic kidney disease     elevated potassiums    Coronary atherosclerosis of native coronary artery     Coronary atherosclerosis of native coronary artery     Diabetes ([facility] [address] [facility]     Essential hypertension     Essential hypertension, benign     Heart failure (HCC)     bypass 6yrs ago    Hypertension     Morbid obesity ([facility] [address] [facility]     Nausea & vomiting     Thyroid disease         ICD-10-CM ICD-9-CM    1. Chronic ischemic heart disease I25.9 414.9 AMB POC EKG ROUTINE W/ 12 LEADS, INTER & REP   2. Essential hypertension, benign I10 401.1    3. Mixed hyperlipidemia E78.2 272.2    4. S/P CABG x 4 Z95.1 V45.81    5. Coronary artery disease involving native coronary artery of native heart without angina pectoris I25.10 414.01       Social History     Tobacco Use    Smoking status: Never Smoker    Smokeless tobacco: Never Used   Substance Use Topics    Alcohol use: Yes     Alcohol/week: 0.0 oz     Comment: occaisionally      History reviewed. No pertinent family history. Review of Systems  Cardiovascular: Negative except as noted in HPI      Objective:       Vitals:    18 1524 18 1533   BP: 122/68 122/72   Pulse: 75    Resp: 16    SpO2: 97%    Weight: 211 lb 12.8 oz (96.1 kg)    Height: 5' 3\" (1.6 m)     Body mass index is 37.52 kg/m². General PE  Mental Status - Alert. General Appearance - Not in acute distress.     Chest and Lung Exam   Inspection: Accessory muscles - No use of accessory muscles in breathing. Auscultation:   Breath sounds: - Normal.    Cardiovascular   Inspection: Jugular vein - Bilateral - Inspection Normal.  Palpation/Percussion:   Apical Impulse: - Normal.  Auscultation: Rhythm - Regular. Heart Sounds - S1 WNL and S2 WNL. No S3 or S4. Murmurs & Other Heart Sounds: Auscultation of the heart reveals - No Murmurs. Peripheral Vascular   Upper Extremity: Inspection - Bilateral - No Cyanotic nailbeds or Digital clubbing. Lower Extremity:   Palpation: Edema - Bilateral - No edema. Data Review:     EKG -  EKG: Sinus  Rhythm   -RSR(V1) -nondiagnostic.    -  Nonspecific T-abnormality. Low voltage -possible pulmonary disease. [facility] [employer] LABS- @brieflabs@      Allergies reviewed  No Known Allergies    Medications reviewed  Current Outpatient Medications   Medication Sig    isosorbide mononitrate ER (IMDUR) 30 mg tablet TAKE ONE-HALF TABLET BY MOUTH DAILY    metoprolol succinate (TOPROL-XL) 25 mg XL tablet TAKE ONE TABLET BY MOUTH DAILY, FOR MORE REFILLS CONTACT .  amLODIPine (NORVASC) 5 mg tablet TAKE ONE TABLET BY MOUTH EVERY DAY    cholecalciferol (VITAMIN D3) 1,000 unit cap Take  by mouth daily.  atorvastatin (LIPITOR) 20 mg tablet TAKE ONE TABLET BY MOUTH EVERY NIGHT AT BEDTIME    FREESTYLE TEST strip      insulin pump (PATIENT SUPPLIED) [facility] by SubCUTAneous route as needed.  aspirin (ASPIRIN) 325 mg tablet Take 325 mg by mouth daily.  methimazole (TAPAZOLE) 10 mg tablet Take 10 mg by mouth daily. Takes 20 mg mon,Tue,Wed,Thur,Fri and 10 mg Sat,Sun    lisinopril (PRINIVIL, ZESTRIL) 5 mg tablet Take 5 mg by mouth daily.  HUMALOG U-100 INSULIN 100 unit/mL injection     acetaminophen-codeine (TYLENOL #3) 300-30 mg per tablet     NOVOLOG 100 unit/mL injection      No current facility-administered medications for this visit. Assessment:       ICD-10-CM ICD-9-CM    1.  Chronic ischemic heart disease I25.9 414.9 AMB POC EKG ROUTINE W/ 12 LEADS, INTER & REP   2. Essential hypertension, benign I10 401.1    3. Mixed hyperlipidemia E78.2 272.2    4. S/P CABG x 4 Z95.1 V45.81    5. Coronary artery disease involving native coronary artery of native heart without angina pectoris I25.10 414.01         Orders Placed This Encounter    AMB POC EKG ROUTINE W/ 12 LEADS, INTER & REP     Order Specific Question:   Reason for Exam:     Answer:   routine    HUMALOG U-100 INSULIN 100 unit/mL injection       Plan:     No angina. BP at target. EKG nonishcemic. Lipids per PCP.   F/U 1 yr    Jonni Burkitt, MD

## 2018-11-22 DIAGNOSIS — I10 ESSENTIAL HYPERTENSION, BENIGN: ICD-10-CM

## 2018-11-22 DIAGNOSIS — I25.10 ATHEROSCLEROSIS OF NATIVE CORONARY ARTERY OF NATIVE HEART WITHOUT ANGINA PECTORIS: ICD-10-CM

## 2018-11-22 DIAGNOSIS — I25.9 CHRONIC ISCHEMIC HEART DISEASE: ICD-10-CM

## 2018-12-07 RX ORDER — AMLODIPINE BESYLATE 5 MG/1
TABLET ORAL
Qty: 90 TAB | Refills: 0 | Status: SHIPPED | OUTPATIENT
Start: 2018-12-07 | End: 2019-03-06 | Stop reason: SDUPTHER

## 2018-12-07 RX ORDER — METOPROLOL SUCCINATE 25 MG/1
TABLET, EXTENDED RELEASE ORAL
Qty: 90 TAB | Refills: 0 | Status: SHIPPED | OUTPATIENT
Start: 2018-12-07 | End: 2019-03-06 | Stop reason: SDUPTHER

## 2019-03-06 DIAGNOSIS — I25.9 CHRONIC ISCHEMIC HEART DISEASE: ICD-10-CM

## 2019-03-06 DIAGNOSIS — I25.10 ATHEROSCLEROSIS OF NATIVE CORONARY ARTERY OF NATIVE HEART WITHOUT ANGINA PECTORIS: ICD-10-CM

## 2019-03-06 DIAGNOSIS — I10 ESSENTIAL HYPERTENSION, BENIGN: ICD-10-CM

## 2019-03-07 RX ORDER — AMLODIPINE BESYLATE 5 MG/1
TABLET ORAL
Qty: 90 TAB | Refills: 0 | Status: SHIPPED | OUTPATIENT
Start: 2019-03-07 | End: 2019-06-03 | Stop reason: SDUPTHER

## 2019-03-07 RX ORDER — METOPROLOL SUCCINATE 25 MG/1
TABLET, EXTENDED RELEASE ORAL
Qty: 90 TAB | Refills: 0 | Status: SHIPPED | OUTPATIENT
Start: 2019-03-07 | End: 2019-06-03 | Stop reason: SDUPTHER

## 2019-03-11 NOTE — TELEPHONE ENCOUNTER
Dr HERRERA patient.
Now Dr Asaf Serrano pt.
Now sees Dr. Downs Sic
Refill request sent to NP for approval.
ER physician

## 2019-06-03 DIAGNOSIS — I25.9 CHRONIC ISCHEMIC HEART DISEASE: ICD-10-CM

## 2019-06-03 DIAGNOSIS — I10 ESSENTIAL HYPERTENSION, BENIGN: ICD-10-CM

## 2019-06-03 DIAGNOSIS — I25.10 ATHEROSCLEROSIS OF NATIVE CORONARY ARTERY OF NATIVE HEART WITHOUT ANGINA PECTORIS: ICD-10-CM

## 2019-06-03 RX ORDER — AMLODIPINE BESYLATE 5 MG/1
TABLET ORAL
Qty: 90 TAB | Refills: 0 | Status: SHIPPED | OUTPATIENT
Start: 2019-06-03 | End: 2019-08-31 | Stop reason: SDUPTHER

## 2019-06-03 RX ORDER — METOPROLOL SUCCINATE 25 MG/1
TABLET, EXTENDED RELEASE ORAL
Qty: 90 TAB | Refills: 0 | Status: SHIPPED | OUTPATIENT
Start: 2019-06-03 | End: 2019-08-31 | Stop reason: SDUPTHER

## 2019-08-31 DIAGNOSIS — I25.10 ATHEROSCLEROSIS OF NATIVE CORONARY ARTERY OF NATIVE HEART WITHOUT ANGINA PECTORIS: ICD-10-CM

## 2019-08-31 DIAGNOSIS — I25.9 CHRONIC ISCHEMIC HEART DISEASE: ICD-10-CM

## 2019-08-31 DIAGNOSIS — I10 ESSENTIAL HYPERTENSION, BENIGN: ICD-10-CM

## 2019-09-03 RX ORDER — AMLODIPINE BESYLATE 5 MG/1
TABLET ORAL
Qty: 90 TAB | Refills: 0 | Status: SHIPPED | OUTPATIENT
Start: 2019-09-03 | End: 2019-12-09 | Stop reason: SDUPTHER

## 2019-09-03 RX ORDER — METOPROLOL SUCCINATE 25 MG/1
TABLET, EXTENDED RELEASE ORAL
Qty: 90 TAB | Refills: 0 | Status: SHIPPED | OUTPATIENT
Start: 2019-09-03 | End: 2019-12-09 | Stop reason: SDUPTHER

## 2019-09-05 DIAGNOSIS — I25.9 CHRONIC ISCHEMIC HEART DISEASE: ICD-10-CM

## 2019-09-05 RX ORDER — ISOSORBIDE MONONITRATE 30 MG/1
TABLET, EXTENDED RELEASE ORAL
Qty: 45 TAB | Refills: 2 | Status: SHIPPED | OUTPATIENT
Start: 2019-09-05 | End: 2020-05-28

## 2019-11-18 ENCOUNTER — OFFICE VISIT (OUTPATIENT)
Dept: CARDIOLOGY CLINIC | Age: 54
End: 2019-11-18

## 2019-11-18 VITALS
OXYGEN SATURATION: 98 % | RESPIRATION RATE: 16 BRPM | WEIGHT: 219.8 LBS | DIASTOLIC BLOOD PRESSURE: 70 MMHG | HEIGHT: 63 IN | SYSTOLIC BLOOD PRESSURE: 134 MMHG | BODY MASS INDEX: 38.95 KG/M2 | HEART RATE: 67 BPM

## 2019-11-18 DIAGNOSIS — I25.10 ATHEROSCLEROSIS OF NATIVE CORONARY ARTERY OF NATIVE HEART WITHOUT ANGINA PECTORIS: Primary | ICD-10-CM

## 2019-11-18 DIAGNOSIS — Z95.1 S/P CABG X 4: ICD-10-CM

## 2019-11-18 DIAGNOSIS — N18.2 CKD (CHRONIC KIDNEY DISEASE), STAGE II: ICD-10-CM

## 2019-11-18 DIAGNOSIS — I25.9 CHRONIC ISCHEMIC HEART DISEASE: ICD-10-CM

## 2019-11-18 DIAGNOSIS — I10 ESSENTIAL HYPERTENSION, BENIGN: ICD-10-CM

## 2019-11-18 DIAGNOSIS — E78.2 MIXED HYPERLIPIDEMIA: ICD-10-CM

## 2019-11-18 NOTE — PROGRESS NOTES
[address], [address] [facility]  [phone]     Subjective:      Penny Ortega is a 53 y.o. 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 Continues to follow with Dr Tg Mcmillan for her diabetes, has insulin pump  Also seeing Dr Cade Pompa for her kidneys. The patient denies chest pain/ shortness of breath, orthopnea, PND, LE edema, palpitations, syncope, or presyncope. Patient Active Problem List    Diagnosis Date Noted    Severe obesity ([facility] 11/19/2018    Coronary atherosclerosis of native coronary artery 11/22/2013    Chest pain, unspecified 03/27/2013    Mixed hyperlipidemia 07/30/2012    Essential hypertension, benign 07/30/2012    DM type 2 (diabetes mellitus, type 2) ([facility] [address] 07/30/2012    Chronic ischemic heart disease 07/30/2012    S/P CABG x 4 07/30/2012      None  Past Medical History:   Diagnosis Date    Chronic kidney disease     elevated potassiums    Coronary atherosclerosis of native coronary artery     Coronary atherosclerosis of native coronary artery     Diabetes ([facility] [address]     Essential hypertension     Essential hypertension, benign     Heart failure (HCC)     bypass 6yrs ago    Hypertension     Morbid obesity (HCC)     Nausea & vomiting     Thyroid disease       Past Surgical History:   Procedure Laterality Date    CABG, ARTERY-VEIN, FOUR      HX HEART CATHETERIZATION      HX ORTHOPAEDIC      shoulder surg    HX TUBAL LIGATION       No Known Allergies   No family history on file.    Social History     Socioeconomic History    Marital status:      Spouse name: Not on file    Number of children: Not on file    Years of education: Not on file    Highest education level: Not on file   Occupational History    Not on file   Social Needs    Financial resource strain: Not on file    Food insecurity:     Worry: Not on file     Inability: Not on file    Transportation needs:     Medical: Not on file     Non-medical: Not on file   Tobacco Use    Smoking status: Never Smoker    Smokeless tobacco: Never Used   Substance and Sexual Activity    Alcohol use: Yes     Alcohol/week: 0.0 standard drinks     Comment: occaisionally    Drug use: Not on file    Sexual activity: Not on file   Lifestyle    Physical activity:     Days per week: Not on file     Minutes per session: Not on file    Stress: Not on file   Relationships    Social connections:     Talks on phone: Not on file     Gets together: Not on file     Attends [employer] service: Not on file     Active member of club or organization: Not on file     Attends meetings of clubs or organizations: Not on file     Relationship status: Not on file    Intimate partner violence:     Fear of current or ex partner: Not on file     Emotionally abused: Not on file     Physically abused: Not on file     Forced sexual activity: Not on file   Other Topics Concern    Not on file   Social History Narrative    Not on file      Current Outpatient Medications   Medication Sig    isosorbide mononitrate ER (IMDUR) 30 mg tablet TAKE ONE-HALF TABLET DAILY    metoprolol succinate (TOPROL-XL) 25 mg XL tablet TAKE ONE TABLET BY MOUTH DAILY    amLODIPine (NORVASC) 5 mg tablet TAKE ONE TABLET BY MOUTH DAILY    HUMALOG U-100 INSULIN 100 unit/mL injection     cholecalciferol (VITAMIN D3) 1,000 unit cap Take  by mouth daily.  atorvastatin (LIPITOR) 20 mg tablet TAKE ONE TABLET BY MOUTH EVERY NIGHT AT BEDTIME    FREESTYLE TEST strip      insulin pump (PATIENT SUPPLIED) Misc by SubCUTAneous route as needed.  aspirin (ASPIRIN) 325 mg tablet Take 325 mg by mouth daily.  methimazole (TAPAZOLE) 10 mg tablet Take 10 mg by mouth daily. Takes 10 mg and 5 mg every other day    lisinopril (PRINIVIL, ZESTRIL) 5 mg tablet Take 5 mg by mouth daily. No current facility-administered medications for this visit. Review of Symptoms:  11 systems reviewed, negative other than as stated in the HPI    Physical ExamPhysical Exam:    Vitals:    11/18/19 1458 11/18/19 1509 11/18/19 1522   BP: 140/68 140/68 134/70   Pulse: 67     Resp: 16     SpO2: 98%     Weight: 219 lb 12.8 oz (99.7 kg)     Height: 5' 3\" (1.6 m)       Body mass index is 38.94 kg/m². General PE  Gen:  NAD  Mental Status - Alert. General Appearance - Not in acute distress. HEENT:  PERRL, no carotid bruits or JVD  Chest and Lung Exam   Inspection: Accessory muscles - No use of accessory muscles in breathing. Auscultation:   Breath sounds: - Normal.   Cardiovascular   Inspection: Jugular vein - Bilateral - Inspection Normal.   Palpation/Percussion:   Apical Impulse: - Normal.   Auscultation: Rhythm - Regular. Heart Sounds - S1 WNL and S2 WNL. No S3 or S4. Murmurs & Other Heart Sounds: Auscultation of the heart reveals - No Murmurs. Peripheral Vascular   Upper Extremity: Inspection - Bilateral - No Cyanotic nailbeds or Digital clubbing. Lower Extremity:   Palpation: Edema - Bilateral - No edema. Abdomen:   Soft, non-tender, bowel sounds are active. Neuro: A&O times 3, CN and motor grossly WNL    Labs:   No results found for: CHOL, CHOLX, CHLST, CHOLV, 358392, HDL, HDLP, LDL, LDLC, DLDLP, TGLX, TRIGL, TRIGP, CHHD, CHHDX  No results found for: CPK, CPKX, CPX  Lab Results   Component Value Date/Time    Sodium 134 (L) 07/10/2010 03:34 AM    Potassium 5.3 (H) 07/10/2010 03:34 AM    Chloride 102 07/10/2010 03:34 AM    CO2 26 07/10/2010 03:34 AM    Anion gap 6 07/10/2010 03:34 AM    Glucose 300 (H) 07/10/2010 03:34 AM    BUN 30 (H) 07/10/2010 03:34 AM    Creatinine 1.5 (H) 07/10/2010 03:34 AM    BUN/Creatinine ratio 20 07/10/2010 03:34 AM    GFR est AA 48 (L) 07/10/2010 03:34 AM    GFR est non-AA 40 (L) 07/10/2010 03:34 AM    Calcium 8.4 (L) 07/10/2010 03:34 AM    Bilirubin, total 0.5 06/24/2010 10:30 AM    AST (SGOT) 14 (L) 06/24/2010 10:30 AM    Alk. phosphatase 196 (H) 06/24/2010 10:30 AM    Protein, total 7.4 06/24/2010 10:30 AM    Albumin 3.5 06/24/2010 10:30 AM    Globulin 3.9 06/24/2010 10:30 AM    A-G Ratio 0.9 (L) 06/24/2010 10:30 AM    ALT (SGPT) 30 06/24/2010 10:30 AM       EKG:  SR     Assessment:     Assessment:      1. Atherosclerosis of native coronary artery of native heart without angina pectoris    2. Mixed hyperlipidemia    3. S/P CABG x 4    4. Essential hypertension, benign    5. CKD (chronic kidney disease), stage II    6. Chronic ischemic heart disease        Orders Placed This Encounter    AMB POC EKG ROUTINE W/ 12 LEADS, INTER & REP     Order Specific Question:   Reason for Exam:     Answer:   Routine        Plan:     Patient presents for f/u, doing well and stable from cardiac standpoint. 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 Continues to follow with Dr Perfecto Adhikari for her diabetes, has insulin pump  Also seeing Dr Annabel De Leon for her kidneys.     ASHD Hx CABG x 4 around 15 yrs ago  Normal stress echo in 2017  Continue ASA BB Imdur statin    HTN  Controlled with current therapy    HLD  On statin  Lipids and labs followed by Dr Perfecto Adhikari    DM  On insulin pump    CKD II  Followed by Dr Enma Myers  On low dose Ace-I for renovascular protection        Continue current care and f/u in 6 mos      Gurjit Lundy MD

## 2019-11-18 NOTE — PROGRESS NOTES
1. Have you been to the ER, urgent care clinic since your last visit? Hospitalized since your last visit? No    2. Have you seen or consulted any other health care providers outside of the [address] [place] [address] since your last visit? Include any pap smears or colon screening. No    Chief Complaint   Patient presents with    Cholesterol Problem     annual f/u    Hypertension     annual f/u     Pt denies cardiac complaints.

## 2019-12-09 DIAGNOSIS — I25.10 ATHEROSCLEROSIS OF NATIVE CORONARY ARTERY OF NATIVE HEART WITHOUT ANGINA PECTORIS: ICD-10-CM

## 2019-12-09 DIAGNOSIS — I10 ESSENTIAL HYPERTENSION, BENIGN: ICD-10-CM

## 2019-12-09 DIAGNOSIS — I25.9 CHRONIC ISCHEMIC HEART DISEASE: ICD-10-CM

## 2019-12-09 RX ORDER — AMLODIPINE BESYLATE 5 MG/1
5 TABLET ORAL DAILY
Qty: 90 TAB | Refills: 2 | Status: SHIPPED | OUTPATIENT
Start: 2019-12-09 | End: 2020-03-19 | Stop reason: SDUPTHER

## 2019-12-09 RX ORDER — METOPROLOL SUCCINATE 25 MG/1
25 TABLET, EXTENDED RELEASE ORAL DAILY
Qty: 90 TAB | Refills: 2 | Status: SHIPPED | OUTPATIENT
Start: 2019-12-09 | End: 2020-03-19 | Stop reason: SDUPTHER

## 2019-12-14 DIAGNOSIS — I25.9 CHRONIC ISCHEMIC HEART DISEASE: ICD-10-CM

## 2019-12-14 DIAGNOSIS — I25.10 ATHEROSCLEROSIS OF NATIVE CORONARY ARTERY OF NATIVE HEART WITHOUT ANGINA PECTORIS: ICD-10-CM

## 2019-12-14 DIAGNOSIS — I10 ESSENTIAL HYPERTENSION, BENIGN: ICD-10-CM

## 2019-12-16 RX ORDER — METOPROLOL SUCCINATE 25 MG/1
TABLET, EXTENDED RELEASE ORAL
Qty: 90 TAB | Refills: 0 | Status: SHIPPED | OUTPATIENT
Start: 2019-12-16 | End: 2020-03-19

## 2019-12-16 RX ORDER — AMLODIPINE BESYLATE 5 MG/1
TABLET ORAL
Qty: 90 TAB | Refills: 0 | Status: SHIPPED | OUTPATIENT
Start: 2019-12-16 | End: 2020-03-19

## 2020-03-16 DIAGNOSIS — I10 ESSENTIAL HYPERTENSION, BENIGN: ICD-10-CM

## 2020-03-16 DIAGNOSIS — I25.10 ATHEROSCLEROSIS OF NATIVE CORONARY ARTERY OF NATIVE HEART WITHOUT ANGINA PECTORIS: ICD-10-CM

## 2020-03-16 DIAGNOSIS — I25.9 CHRONIC ISCHEMIC HEART DISEASE: ICD-10-CM

## 2020-03-19 RX ORDER — METOPROLOL SUCCINATE 25 MG/1
TABLET, EXTENDED RELEASE ORAL
Qty: 90 TAB | Refills: 0 | Status: SHIPPED | OUTPATIENT
Start: 2020-03-19 | End: 2020-06-15

## 2020-03-19 RX ORDER — AMLODIPINE BESYLATE 5 MG/1
TABLET ORAL
Qty: 90 TAB | Refills: 0 | Status: SHIPPED | OUTPATIENT
Start: 2020-03-19 | End: 2020-06-15

## 2020-05-28 DIAGNOSIS — I25.9 CHRONIC ISCHEMIC HEART DISEASE: ICD-10-CM

## 2020-05-28 RX ORDER — ISOSORBIDE MONONITRATE 30 MG/1
TABLET, EXTENDED RELEASE ORAL
Qty: 45 TAB | Refills: 1 | Status: SHIPPED | OUTPATIENT
Start: 2020-05-28 | End: 2020-11-30

## 2020-11-24 NOTE — PROGRESS NOTES
[address] [facility], [address] [facility]  [phone]     Subjective:      Yuan Vera is a 63 y.o. female is here for routine f/u. She has pmhx ASHD s/p CABG x 4  HTN HLD CKD II and DM. Last seen by us in 11/19. 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 Continues to follow with Dr Tere Guerrero for her diabetes, has insulin pump  Also seeing Dr Ruby Estevez for her kidneys. No cardiac complaints. The patient denies chest pain/ shortness of breath, orthopnea, PND, LE edema, palpitations, syncope, or presyncope. Patient Active Problem List    Diagnosis Date Noted    Severe obesity ([facility] [address] 11/19/2018    Coronary atherosclerosis of native coronary artery 11/22/2013    Chest pain, unspecified 03/27/2013    Mixed hyperlipidemia 07/30/2012    Essential hypertension, benign 07/30/2012    DM type 2 (diabetes mellitus, type 2) ([facility] [place] [address] 07/30/2012    Chronic ischemic heart disease 07/30/2012    S/P CABG x 4 07/30/2012      None  Past Medical History:   Diagnosis Date    Chronic kidney disease     elevated potassiums    Coronary atherosclerosis of native coronary artery     Coronary atherosclerosis of native coronary artery     Diabetes ([facility] [address]     Essential hypertension     Essential hypertension, benign     Heart failure (HCC)     bypass 6yrs ago    Hypertension     Morbid obesity (HCC)     Nausea & vomiting     Thyroid disease       Past Surgical History:   Procedure Laterality Date    CABG, ARTERY-VEIN, FOUR      HX HEART CATHETERIZATION      HX ORTHOPAEDIC      shoulder surg    HX TUBAL LIGATION       No Known Allergies   No family history on file.    Social History     Socioeconomic History    Marital status:      Spouse name: Not on file    Number of children: Not on file    Years of education: Not on file    Highest education level: Not on file   Occupational History    Not on file   Social Needs    Financial resource strain: Not on file    Food insecurity     Worry: Not on file     Inability: Not on file    Transportation needs     Medical: Not on file     Non-medical: Not on file   Tobacco Use    Smoking status: Never Smoker    Smokeless tobacco: Never Used   Substance and Sexual Activity    Alcohol use: Not Currently     Alcohol/week: 0.0 standard drinks    Drug use: Never    Sexual activity: Not on file   Lifestyle    Physical activity     Days per week: Not on file     Minutes per session: Not on file    Stress: Not on file   Relationships    Social connections     Talks on phone: Not on file     Gets together: Not on file     Attends Quaker service: Not on file     Active member of club or organization: Not on file     Attends meetings of clubs or organizations: Not on file     Relationship status: Not on file    Intimate partner violence     Fear of current or ex partner: Not on file     Emotionally abused: Not on file     Physically abused: Not on file     Forced sexual activity: Not on file   Other Topics Concern    Not on file   Social History Narrative    Not on file      Current Outpatient Medications   Medication Sig    ketoconazole (NIZORAL) 2 % topical cream Apply  to affected area as needed.  amLODIPine (NORVASC) 5 mg tablet TAKE ONE TABLET BY MOUTH DAILY    metoprolol succinate (TOPROL-XL) 25 mg XL tablet TAKE ONE TABLET BY MOUTH DAILY    isosorbide mononitrate ER (IMDUR) 30 mg tablet TAKE ONE-HALF TABLET BY MOUTH DAILY    HUMALOG U-100 INSULIN 100 unit/mL injection     cholecalciferol (VITAMIN D3) 1,000 unit cap Take  by mouth daily.  atorvastatin (LIPITOR) 20 mg tablet TAKE ONE TABLET BY MOUTH EVERY NIGHT AT BEDTIME    FREESTYLE TEST strip      insulin pump (PATIENT SUPPLIED) Misc by SubCUTAneous route as needed.  aspirin (ASPIRIN) 325 mg tablet Take 81 mg by mouth daily.  methimazole (TAPAZOLE) 10 mg tablet Take 10 mg by mouth two (2) times a day.     lisinopril (PRINIVIL, ZESTRIL) 5 mg tablet Take 5 mg by mouth daily. No current facility-administered medications for this visit. Review of Symptoms:  11 systems reviewed, negative other than as stated in the HPI    Physical ExamPhysical Exam:    Vitals:    11/25/20 1122   BP: 134/72   Pulse: 69   Resp: 16   SpO2: 99%   Weight: 213 lb 9.6 oz (96.9 kg)   Height: 5' 3\" (1.6 m)     Body mass index is 37.84 kg/m². General PE  Gen:  NAD  Mental Status - Alert. General Appearance - Not in acute distress. HEENT:  PERRL, no carotid bruits or JVD  Chest and Lung Exam   Inspection: Accessory muscles - No use of accessory muscles in breathing. Auscultation:   Breath sounds: - Normal.   Cardiovascular   Inspection: Jugular vein - Bilateral - Inspection Normal.   Palpation/Percussion:   Apical Impulse: - Normal.   Auscultation: Rhythm - Regular. Heart Sounds - S1 WNL and S2 WNL. No S3 or S4. Murmurs & Other Heart Sounds: Auscultation of the heart reveals - No Murmurs. Peripheral Vascular   Upper Extremity: Inspection - Bilateral - No Cyanotic nailbeds or Digital clubbing. Lower Extremity:   Palpation: Edema - Bilateral - No edema. Abdomen:   Soft, non-tender, bowel sounds are active.   Neuro: A&O times 3, CN and motor grossly WNL    Labs:   No results found for: CHOL, CHOLX, CHLST, CHOLV, [id number], HDL, HDLP, LDL, LDLC, DLDLP, TGLX, TRIGL, TRIGP, CHHD, CHHDX  No results found for: CPK, CPKX, CPX  Lab Results   Component Value Date/Time    Sodium 134 (L) 07/10/2010 03:34 AM    Potassium 5.3 (H) 07/10/2010 03:34 AM    Chloride 102 07/10/2010 03:34 AM    CO2 26 07/10/2010 03:34 AM    Anion gap 6 07/10/2010 03:34 AM    Glucose 300 (H) 07/10/2010 03:34 AM    BUN 30 (H) 07/10/2010 03:34 AM    Creatinine 1.5 (H) 07/10/2010 03:34 AM    BUN/Creatinine ratio 20 07/10/2010 03:34 AM    GFR est AA 48 (L) 07/10/2010 03:34 AM    GFR est non-AA 40 (L) 07/10/2010 03:34 AM    Calcium 8.4 (L) 07/10/2010 03:34 AM    Bilirubin, total 0.5 06/24/2010 10:30 AM    Alk. phosphatase 196 (H) 06/24/2010 10:30 AM    Protein, total 7.4 06/24/2010 10:30 AM    Albumin 3.5 06/24/2010 10:30 AM    Globulin 3.9 06/24/2010 10:30 AM    A-G Ratio 0.9 (L) 06/24/2010 10:30 AM    ALT (SGPT) 30 06/24/2010 10:30 AM       EKG:  NSR      Assessment:     Assessment:      1. Atherosclerosis of native coronary artery of native heart without angina pectoris    2. Essential hypertension, benign    3. Chronic ischemic heart disease    4. Mixed hyperlipidemia    5. S/P CABG x 4        Orders Placed This Encounter    AMB POC EKG ROUTINE W/ 12 LEADS, INTER & REP     Order Specific Question:   Reason for Exam:     Answer:   routine    ketoconazole (NIZORAL) 2 % topical cream     Sig: Apply  to affected area as needed.         Plan:     ASHD Hx CABG x 4 around 15 yrs ago  Normal stress echo in 2017  Continue ASA BB Imdur statin     HTN  Controlled with current therapy     HLD  On statin  Lipids and labs followed by Dr Saw [place]  Will request labs from endo, done 9/28/2020     DM  On insulin pump     CKD II  Followed by Dr Carrie Fang  On low dose Ace-I for renovascular protection           Continue current care and f/u in 1 yr       Terrance Orellana MD

## 2020-11-25 ENCOUNTER — OFFICE VISIT (OUTPATIENT)
Dept: CARDIOLOGY CLINIC | Age: 55
End: 2020-11-25
Payer: COMMERCIAL

## 2020-11-25 VITALS
WEIGHT: 213.6 LBS | HEIGHT: 63 IN | OXYGEN SATURATION: 99 % | DIASTOLIC BLOOD PRESSURE: 72 MMHG | BODY MASS INDEX: 37.85 KG/M2 | HEART RATE: 69 BPM | RESPIRATION RATE: 16 BRPM | SYSTOLIC BLOOD PRESSURE: 134 MMHG

## 2020-11-25 DIAGNOSIS — Z95.1 S/P CABG X 4: ICD-10-CM

## 2020-11-25 DIAGNOSIS — I25.10 ATHEROSCLEROSIS OF NATIVE CORONARY ARTERY OF NATIVE HEART WITHOUT ANGINA PECTORIS: Primary | ICD-10-CM

## 2020-11-25 DIAGNOSIS — I10 ESSENTIAL HYPERTENSION, BENIGN: ICD-10-CM

## 2020-11-25 DIAGNOSIS — I25.9 CHRONIC ISCHEMIC HEART DISEASE: ICD-10-CM

## 2020-11-25 DIAGNOSIS — E78.2 MIXED HYPERLIPIDEMIA: ICD-10-CM

## 2020-11-25 PROCEDURE — 93000 ELECTROCARDIOGRAM COMPLETE: CPT | Performed by: INTERNAL MEDICINE

## 2020-11-25 PROCEDURE — 99214 OFFICE O/P EST MOD 30 MIN: CPT | Performed by: INTERNAL MEDICINE

## 2020-11-25 RX ORDER — KETOCONAZOLE 20 MG/G
CREAM TOPICAL AS NEEDED
COMMUNITY
Start: 2020-10-19

## 2020-11-25 NOTE — PROGRESS NOTES
1. Have you been to the ER, urgent care clinic since your last visit? Hospitalized since your last visit? No.    2. Have you seen or consulted any other health care providers outside of the [address] [place] [address] since your last visit? Include any pap smears or colon screening.   No.      Chief Complaint   Patient presents with    Annual Exam     pt denies any cardiac symptoms

## 2021-12-03 DIAGNOSIS — I25.9 CHRONIC ISCHEMIC HEART DISEASE: ICD-10-CM

## 2021-12-03 RX ORDER — ISOSORBIDE MONONITRATE 30 MG/1
TABLET, EXTENDED RELEASE ORAL
Qty: 45 TABLET | Refills: 3 | Status: SHIPPED | OUTPATIENT
Start: 2021-12-03

## 2021-12-03 NOTE — PROGRESS NOTES
[address] [place] [address] [facility]  [phone]     Subjective:      Dottie Bell is a 61 y.o. female is here for routine f/u. She has pmhx ASHD s/p CABG x 4  HTN HLD CKD II and DM. Last seen by us in 11/25/20. 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 She denies any exertional CP or SOB. Continues to follow with Dr Aide Henderson for her diabetes, has insulin pump  Also seeing Dr Jadyn Middleton for her kidneys. She denies chest pain, shortness of breath, orthopnea, PND, LE edema, palpitations, syncope, or presyncope. Patient Active Problem List    Diagnosis Date Noted    Severe obesity ([address] 11/19/2018    Coronary atherosclerosis of native coronary artery 11/22/2013    Chest pain, unspecified 03/27/2013    Mixed hyperlipidemia 07/30/2012    Essential hypertension, benign 07/30/2012    DM type 2 (diabetes mellitus, type 2) ([address] 07/30/2012    Chronic ischemic heart disease 07/30/2012    S/P CABG x 4 07/30/2012      None  Past Medical History:   Diagnosis Date    Chronic kidney disease     elevated potassiums    Coronary atherosclerosis of native coronary artery     Coronary atherosclerosis of native coronary artery     Diabetes ([address]     Essential hypertension     Essential hypertension, benign     Heart failure (HCC)     bypass 6yrs ago    Hyperlipidemia     Hypertension     Morbid obesity ([address]     Nausea & vomiting     Thyroid disease       Past Surgical History:   Procedure Laterality Date    HX HEART CATHETERIZATION      HX ORTHOPAEDIC      shoulder surg    HX TUBAL LIGATION      [place] CABG, ARTERY-VEIN, FOUR       No Known Allergies   History reviewed. No pertinent family history.    Social History     Socioeconomic History    Marital status:      Spouse name: Not on file    Number of children: Not on file    Years of education: Not on file    Highest education level: Not on file   Occupational History    Not on file Tobacco Use    Smoking status: Never Smoker    Smokeless tobacco: Never Used   Vaping Use    Vaping Use: Never used   Substance and Sexual Activity    Alcohol use: Not Currently     Alcohol/week: 0.0 standard drinks    Drug use: Never    Sexual activity: Not on file   Other Topics Concern    Not on file   Social History Narrative    Not on file     Social Determinants of Health     Financial Resource Strain:     Difficulty of Paying Living Expenses: Not on file   Food Insecurity:     Worried About Running Out of Food in the Last Year: Not on file    Nicho of Food in the Last Year: Not on file   Transportation Needs:     Lack of Transportation (Medical): Not on file    Lack of Transportation (Non-Medical):  Not on file   Physical Activity:     Days of Exercise per Week: Not on file    Minutes of Exercise per Session: Not on file   Stress:     Feeling of Stress : Not on file   Social Connections:     Frequency of Communication with Friends and Family: Not on file    Frequency of Social Gatherings with Friends and Family: Not on file    Attends Taoism Services: Not on file    Active Member of [address] [place] [address] or Organizations: Not on file    Attends Club or Organization Meetings: Not on file    Marital Status: Not on file   Intimate Partner Violence:     Fear of Current or Ex-Partner: Not on file    Emotionally Abused: Not on file    Physically Abused: Not on file    Sexually Abused: Not on file   Housing Stability:     Unable to Pay for Housing in the Last Year: Not on file    Number of [place] in the Last Year: Not on file    Unstable Housing in the Last Year: Not on file      Current Outpatient Medications   Medication Sig    isosorbide mononitrate ER (IMDUR) 30 mg tablet TAKE 1/2 TABLET BY MOUTH DAILY    metoprolol succinate (TOPROL-XL) 25 mg XL tablet TAKE ONE TABLET BY MOUTH DAILY    amLODIPine (NORVASC) 5 mg tablet TAKE ONE TABLET BY MOUTH DAILY    ketoconazole (NIZORAL) 2 % topical cream Apply  to affected area as needed.  HUMALOG U-100 INSULIN 100 unit/mL injection     cholecalciferol (VITAMIN D3) 1,000 unit cap Take  by mouth daily.  atorvastatin (LIPITOR) 20 mg tablet TAKE ONE TABLET BY MOUTH EVERY NIGHT AT BEDTIME    FREESTYLE TEST strip      insulin pump (PATIENT SUPPLIED) Misc by SubCUTAneous route as needed.  aspirin (ASPIRIN) 325 mg tablet Take 81 mg by mouth daily.  methimazole (TAPAZOLE) 10 mg tablet Take 10 mg by mouth. 1 tablet Monday through Friday    lisinopril (PRINIVIL, ZESTRIL) 5 mg tablet Take 5 mg by mouth daily. No current facility-administered medications for this visit. Review of Symptoms:  11 systems reviewed, negative other than as stated in the HPI    Physical ExamPhysical Exam:    Vitals:    12/07/21 1504   BP: 136/70   Pulse: 65   Resp: 18   SpO2: 98%   Weight: 215 lb 11.2 oz (97.8 kg)   Height: 5' 3\" (1.6 m)     Body mass index is 38.21 kg/m². General PE  Gen:  NAD  Mental Status - Alert. General Appearance - Not in acute distress. HEENT:  PERRL, no carotid bruits or JVD  Chest and Lung Exam   Inspection: Accessory muscles - No use of accessory muscles in breathing. Auscultation:   Breath sounds: - Normal.   Cardiovascular   Inspection: Jugular vein - Bilateral - Inspection Normal.   Palpation/Percussion:   Apical Impulse: - Normal.   Auscultation: Rhythm - Regular. Heart Sounds - S1 WNL and S2 WNL. No S3 or S4. Murmurs & Other Heart Sounds: Auscultation of the heart reveals - No Murmurs. Peripheral Vascular   Upper Extremity: Inspection - Bilateral - No Cyanotic nailbeds or Digital clubbing. Lower Extremity:   Palpation: Edema - Bilateral - No edema. Abdomen:   Soft, non-tender, bowel sounds are active.   Neuro: A&O times 3, CN and motor grossly WNL    Labs:   No results found for: CHOL, CHOLX, CHLST, CHOLV, 265901, HDL, HDLP, LDL, LDLC, DLDLP, TGLX, TRIGL, TRIGP, CHHD, CHHDX  No results found for: CPK, CPKX, CPX  Lab Results   Component Value Date/Time    Sodium 134 (L) 07/10/2010 03:34 AM    Potassium 5.3 (H) 07/10/2010 03:34 AM    Chloride 102 07/10/2010 03:34 AM    CO2 26 07/10/2010 03:34 AM    Anion gap 6 07/10/2010 03:34 AM    Glucose 300 (H) 07/10/2010 03:34 AM    BUN 30 (H) 07/10/2010 03:34 AM    Creatinine 1.5 (H) 07/10/2010 03:34 AM    BUN/Creatinine ratio 20 07/10/2010 03:34 AM    GFR est AA 48 (L) 07/10/2010 03:34 AM    GFR est non-AA 40 (L) 07/10/2010 03:34 AM    Calcium 8.4 (L) 07/10/2010 03:34 AM    Bilirubin, total 0.5 2010 10:30 AM    Alk. phosphatase 196 (H) 2010 10:30 AM    Protein, total 7.4 2010 10:30 AM    Albumin 3.5 2010 10:30 AM    Globulin 3.9 2010 10:30 AM    A-G Ratio 0.9 (L) 2010 10:30 AM    ALT (SGPT) 30 2010 10:30 AM       EKG:Sinus  Rhythm HR 65   Old anterior infarct. Nonspecific T-abnormality. Low voltage -possible pulmonary disease. Assessment:      1. Coronary artery disease involving native coronary artery of native heart without angina pectoris    2. Chronic ischemic heart disease    3. Essential hypertension, benign    4. Mixed hyperlipidemia    5. S/P CABG x 4        Orders Placed This Encounter    AMB POC EKG ROUTINE W/ 12 LEADS, INTER & REP     Order Specific Question:   Reason for Exam:     Answer:   ROUTINE        Plan:     CAD Hx CABG x 4 around 15 yrs ago  Normal stress echo in 2017  Denies angina or anginal equivalent symptoms  ECG SR without significant changes noted when c/w ECG 20. Continue ASA BB Imdur statin     HTN  Controlled with current therapy- Imdur 15mg, Toprol 25mg, Amlodipine 5mg, and Lisinopril 5mg.      HLD  On statin, no recent labs for review.    Lipids and labs followed by Dr Shawnee Escalona  Will request labs from endo, done 2020     DM  On insulin pump     CKD II  Followed by Dr Tamara Jansen  On low dose Ace-I for renovascular protection           Continue current care and f/u in 1 yr with  Conor Jacobs, NP

## 2021-12-07 ENCOUNTER — OFFICE VISIT (OUTPATIENT)
Dept: CARDIOLOGY CLINIC | Age: 56
End: 2021-12-07

## 2021-12-07 VITALS
SYSTOLIC BLOOD PRESSURE: 136 MMHG | HEART RATE: 65 BPM | HEIGHT: 63 IN | OXYGEN SATURATION: 98 % | BODY MASS INDEX: 38.22 KG/M2 | WEIGHT: 215.7 LBS | DIASTOLIC BLOOD PRESSURE: 70 MMHG | RESPIRATION RATE: 18 BRPM

## 2021-12-07 DIAGNOSIS — I25.9 CHRONIC ISCHEMIC HEART DISEASE: ICD-10-CM

## 2021-12-07 DIAGNOSIS — E78.2 MIXED HYPERLIPIDEMIA: ICD-10-CM

## 2021-12-07 DIAGNOSIS — I25.10 CORONARY ARTERY DISEASE INVOLVING NATIVE CORONARY ARTERY OF NATIVE HEART WITHOUT ANGINA PECTORIS: Primary | ICD-10-CM

## 2021-12-07 DIAGNOSIS — I10 ESSENTIAL HYPERTENSION, BENIGN: ICD-10-CM

## 2021-12-07 DIAGNOSIS — Z95.1 S/P CABG X 4: ICD-10-CM

## 2021-12-07 PROCEDURE — 93000 ELECTROCARDIOGRAM COMPLETE: CPT | Performed by: NURSE PRACTITIONER

## 2021-12-07 PROCEDURE — 99214 OFFICE O/P EST MOD 30 MIN: CPT | Performed by: NURSE PRACTITIONER

## 2021-12-07 NOTE — PROGRESS NOTES
1. Have you been to the ER, urgent care clinic since your last visit? Hospitalized since your last visit? No    2. Have you seen or consulted any other health care providers outside of the [address] [place] [address] since your last visit? Include any pap smears or colon screening.  No       Chief Complaint   Patient presents with    Coronary Artery Disease     Pt denies cardiac symptoms

## 2022-03-18 PROBLEM — E66.01 SEVERE OBESITY (HCC): Status: ACTIVE | Noted: 2018-11-19

## 2023-08-31 ENCOUNTER — HOSPITAL ENCOUNTER (OUTPATIENT)
Facility: HOSPITAL | Age: 58
Discharge: HOME | End: 2023-08-31
Attending: INTERNAL MEDICINE | Admitting: INTERNAL MEDICINE
Payer: COMMERCIAL

## 2023-08-31 VITALS
TEMPERATURE: 98.2 F | OXYGEN SATURATION: 97 % | WEIGHT: 215 LBS | SYSTOLIC BLOOD PRESSURE: 127 MMHG | BODY MASS INDEX: 38.09 KG/M2 | HEIGHT: 63 IN | HEART RATE: 84 BPM | DIASTOLIC BLOOD PRESSURE: 54 MMHG | RESPIRATION RATE: 20 BRPM

## 2023-08-31 DIAGNOSIS — R94.39 ABNORMAL STRESS TEST: ICD-10-CM

## 2023-08-31 LAB
ACT BLD: 480 SECS (ref 79–138)
ECHO BSA: 2.08 M2
EKG ATRIAL RATE: 64 BPM
EKG DIAGNOSIS: NORMAL
EKG P AXIS: 61 DEGREES
EKG P-R INTERVAL: 192 MS
EKG Q-T INTERVAL: 412 MS
EKG QRS DURATION: 98 MS
EKG QTC CALCULATION (BAZETT): 425 MS
EKG R AXIS: 20 DEGREES
EKG T AXIS: 90 DEGREES
EKG VENTRICULAR RATE: 64 BPM
GLUCOSE BLD STRIP.AUTO-MCNC: 180 MG/DL (ref 65–117)
SERVICE CMNT-IMP: ABNORMAL

## 2023-08-31 PROCEDURE — C1874 STENT, COATED/COV W/DEL SYS: HCPCS | Performed by: INTERNAL MEDICINE

## 2023-08-31 PROCEDURE — C1887 CATHETER, GUIDING: HCPCS | Performed by: INTERNAL MEDICINE

## 2023-08-31 PROCEDURE — C1725 CATH, TRANSLUMIN NON-LASER: HCPCS | Performed by: INTERNAL MEDICINE

## 2023-08-31 PROCEDURE — 82962 GLUCOSE BLOOD TEST: CPT

## 2023-08-31 PROCEDURE — 2500000003 HC RX 250 WO HCPCS: Performed by: INTERNAL MEDICINE

## 2023-08-31 PROCEDURE — 85347 COAGULATION TIME ACTIVATED: CPT

## 2023-08-31 PROCEDURE — 76937 US GUIDE VASCULAR ACCESS: CPT | Performed by: INTERNAL MEDICINE

## 2023-08-31 PROCEDURE — C1769 GUIDE WIRE: HCPCS | Performed by: INTERNAL MEDICINE

## 2023-08-31 PROCEDURE — 6360000002 HC RX W HCPCS: Performed by: INTERNAL MEDICINE

## 2023-08-31 PROCEDURE — 6360000004 HC RX CONTRAST MEDICATION: Performed by: INTERNAL MEDICINE

## 2023-08-31 PROCEDURE — C1894 INTRO/SHEATH, NON-LASER: HCPCS | Performed by: INTERNAL MEDICINE

## 2023-08-31 PROCEDURE — 6370000000 HC RX 637 (ALT 250 FOR IP): Performed by: INTERNAL MEDICINE

## 2023-08-31 PROCEDURE — 2709999900 HC NON-CHARGEABLE SUPPLY: Performed by: INTERNAL MEDICINE

## 2023-08-31 PROCEDURE — 93455 CORONARY ART/GRFT ANGIO S&I: CPT | Performed by: INTERNAL MEDICINE

## 2023-08-31 PROCEDURE — 99153 MOD SED SAME PHYS/QHP EA: CPT | Performed by: INTERNAL MEDICINE

## 2023-08-31 PROCEDURE — 99152 MOD SED SAME PHYS/QHP 5/>YRS: CPT | Performed by: INTERNAL MEDICINE

## 2023-08-31 PROCEDURE — C9604 PERC D-E COR REVASC T CABG S: HCPCS | Performed by: INTERNAL MEDICINE

## 2023-08-31 DEVICE — STENT ONYXNG35038UX ONYX 3.50X38RX
Type: IMPLANTABLE DEVICE | Status: FUNCTIONAL
Brand: ONYX FRONTIER™

## 2023-08-31 RX ORDER — LIDOCAINE HYDROCHLORIDE 10 MG/ML
INJECTION, SOLUTION INFILTRATION; PERINEURAL PRN
Status: DISCONTINUED | OUTPATIENT
Start: 2023-08-31 | End: 2023-08-31 | Stop reason: HOSPADM

## 2023-08-31 RX ORDER — FENTANYL CITRATE 50 UG/ML
INJECTION, SOLUTION INTRAMUSCULAR; INTRAVENOUS PRN
Status: DISCONTINUED | OUTPATIENT
Start: 2023-08-31 | End: 2023-08-31 | Stop reason: HOSPADM

## 2023-08-31 RX ORDER — ASPIRIN 81 MG/1
TABLET, CHEWABLE ORAL PRN
Status: DISCONTINUED | OUTPATIENT
Start: 2023-08-31 | End: 2023-08-31 | Stop reason: HOSPADM

## 2023-08-31 RX ORDER — HEPARIN SODIUM 10000 [USP'U]/ML
INJECTION, SOLUTION INTRAVENOUS; SUBCUTANEOUS PRN
Status: DISCONTINUED | OUTPATIENT
Start: 2023-08-31 | End: 2023-08-31 | Stop reason: HOSPADM

## 2023-08-31 RX ORDER — VERAPAMIL HYDROCHLORIDE 2.5 MG/ML
INJECTION, SOLUTION INTRAVENOUS PRN
Status: DISCONTINUED | OUTPATIENT
Start: 2023-08-31 | End: 2023-08-31 | Stop reason: HOSPADM

## 2023-08-31 RX ORDER — CLOPIDOGREL BISULFATE 75 MG/1
75 TABLET ORAL DAILY
Qty: 30 TABLET | Refills: 12 | Status: SHIPPED | OUTPATIENT
Start: 2023-08-31

## 2023-08-31 RX ORDER — HEPARIN SODIUM 1000 [USP'U]/ML
INJECTION, SOLUTION INTRAVENOUS; SUBCUTANEOUS PRN
Status: DISCONTINUED | OUTPATIENT
Start: 2023-08-31 | End: 2023-08-31 | Stop reason: HOSPADM

## 2023-08-31 RX ORDER — CLOPIDOGREL 300 MG/1
TABLET, FILM COATED ORAL PRN
Status: DISCONTINUED | OUTPATIENT
Start: 2023-08-31 | End: 2023-08-31 | Stop reason: HOSPADM

## 2023-08-31 ASSESSMENT — PAIN SCALES - GENERAL: PAINLEVEL_OUTOF10: 0

## 2023-08-31 NOTE — PROGRESS NOTES
Dual Skin preformed with TIFFANY solorzano  Cardiac Cath Lab Recovery Arrival Note:      Ryne Hussein arrived to Cardiac Cath Lab, Recovery Area. Staff introduced to patient. Patient identifiers verified with NAME and DATE OF BIRTH. Procedure verified with patient. Consent forms reviewed and signed by patient or authorized representative and verified. Allergies verified. Patient and family oriented to department. Patient and family informed of procedure and plan of care. Questions answered with review. Patient prepped for procedure, per orders from physician, prior to arrival.    Patient on cardiac monitor, non-invasive blood pressure, SPO2 monitor. On RA. Patient is A&Ox 4. Patient reports no pain. Patient in stretcher, in low position, with side rails up, call bell within reach, patient instructed to call if assistance as needed. Patient prep in: 5 [facility] 4. Patient family has pager #   Family in: vikki.    Prep by: Herrera Tolbert

## 2023-08-31 NOTE — PROGRESS NOTES
Vasc band thought to have 15 cc of air per CCL verbal and bedside report; Pt c/o pain in hand, hand is cool to touch (opposing hand also cool), Pulse +1, cap refill <3sec. Told to remove air per Dr Garrison Na; 3cc of air removed slowly (thought to have 12cc of air); Site begins to ooze, 1mL added back; When vasc band is completely removed, there was only 7cc of air in it. Gauze and tegaderm applied;   New blood noted on gauze, dressing removed; small hematoma and minor oozing present  Vasc band reapplied with 9cc of air at 1725  Oozing has stopped     1825 reattempted to rempove TR; no hematoma, but oozing still present  Pt patent at 5cc

## 2023-09-01 DIAGNOSIS — Z95.5 STENTED CORONARY ARTERY: Primary | ICD-10-CM

## 2023-09-01 NOTE — PROGRESS NOTES
2020 Left ulnar site clean dry and intact, soft. VASC band off. Pt ambulated to the bathroom and voided. Pt returned to the recliner. 2210 Pt ambuulated in the hallway.  No c/o chest pain or SOB

## 2023-09-01 NOTE — PLAN OF CARE
Problem: Chronic Conditions and Co-morbidities  Goal: Patient's chronic conditions and co-morbidity symptoms are monitored and maintained or improved  Outcome: Adequate for Discharge  Flowsheets (Taken 8/31/2023 1200 by Veronica Lin RN)  Care Plan - Patient's Chronic Conditions and Co-Morbidity Symptoms are Monitored and Maintained or Improved:   Monitor and assess patient's chronic conditions and comorbid symptoms for stability, deterioration, or improvement   Collaborate with multidisciplinary team to address chronic and comorbid conditions and prevent exacerbation or deterioration     Problem: Discharge Planning  Goal: Discharge to home or other facility with appropriate resources  Outcome: Adequate for Discharge     Problem: Pain  Goal: Verbalizes/displays adequate comfort level or baseline comfort level  Outcome: Adequate for Discharge     Problem: Safety - Adult  Goal: Free from fall injury  Outcome: Adequate for Discharge

## 2023-09-01 NOTE — CARDIO/PULMONARY
Chart reviewed: Patient is 60 y.o. female admitted with Abnormal stress test [R94.39]    Cardiac cath 8/31/23 with intervention. CAD folder mailed, order entered and f/u call will be made.     Memo Taylor RN

## 2023-09-01 NOTE — PROGRESS NOTES
2223 Patient is ambulating at baseline and vitals stable. Left ulnar site clean, dry, intact. Discharge and follow-up care given. Site care instructions and medication changes reviewed. Patient discharged home with  as .

## 2024-02-26 ENCOUNTER — APPOINTMENT (OUTPATIENT)
Facility: HOSPITAL | Age: 59
End: 2024-02-26
Payer: COMMERCIAL

## 2024-02-26 ENCOUNTER — HOSPITAL ENCOUNTER (INPATIENT)
Facility: HOSPITAL | Age: 59
LOS: 5 days | Discharge: HOME | End: 2024-03-02
Attending: STUDENT IN AN ORGANIZED HEALTH CARE EDUCATION/TRAINING PROGRAM | Admitting: INTERNAL MEDICINE
Payer: COMMERCIAL

## 2024-02-26 ENCOUNTER — APPOINTMENT (OUTPATIENT)
Facility: HOSPITAL | Age: 59
End: 2024-02-26
Attending: INTERNAL MEDICINE
Payer: COMMERCIAL

## 2024-02-26 DIAGNOSIS — I50.9 CONGESTIVE HEART FAILURE, UNSPECIFIED HF CHRONICITY, UNSPECIFIED HEART FAILURE TYPE (HCC): Primary | ICD-10-CM

## 2024-02-26 DIAGNOSIS — J18.9 COMMUNITY ACQUIRED PNEUMONIA OF LEFT LOWER LOBE OF LUNG: ICD-10-CM

## 2024-02-26 DIAGNOSIS — J90 PLEURAL EFFUSION: ICD-10-CM

## 2024-02-26 DIAGNOSIS — R06.09 DYSPNEA ON EXERTION: ICD-10-CM

## 2024-02-26 PROBLEM — J15.9 PNEUMONIA, BACTERIAL: Status: ACTIVE | Noted: 2024-02-26

## 2024-02-26 LAB
ALBUMIN SERPL-MCNC: 2.4 G/DL (ref 3.5–5)
ALBUMIN/GLOB SERPL: 0.6 (ref 1.1–2.2)
ALP SERPL-CCNC: 94 U/L (ref 45–117)
ALT SERPL-CCNC: 38 U/L (ref 12–78)
ANION GAP SERPL CALC-SCNC: 5 MMOL/L (ref 5–15)
AST SERPL-CCNC: 26 U/L (ref 15–37)
BASOPHILS # BLD: 0.1 K/UL (ref 0–0.1)
BASOPHILS NFR BLD: 1 % (ref 0–1)
BILIRUB SERPL-MCNC: 0.8 MG/DL (ref 0.2–1)
BUN SERPL-MCNC: 20 MG/DL (ref 6–20)
BUN/CREAT SERPL: 15 (ref 12–20)
CALCIUM SERPL-MCNC: 8.5 MG/DL (ref 8.5–10.1)
CHLORIDE SERPL-SCNC: 111 MMOL/L (ref 97–108)
CO2 SERPL-SCNC: 23 MMOL/L (ref 21–32)
CREAT SERPL-MCNC: 1.31 MG/DL (ref 0.55–1.02)
DIFFERENTIAL METHOD BLD: ABNORMAL
ECHO AV CUSP MM: 1.5 CM
ECHO AV MEAN GRADIENT: 3 MMHG
ECHO AV MEAN VELOCITY: 0.8 M/S
ECHO AV PEAK GRADIENT: 4 MMHG
ECHO AV PEAK GRADIENT: 4 MMHG
ECHO AV PEAK VELOCITY: 1.1 M/S
ECHO AV PEAK VELOCITY: 1.1 M/S
ECHO AV VTI: 17.2 CM
ECHO BSA: 2.08 M2
ECHO LA DIAMETER INDEX: 1.71 CM/M2
ECHO LA DIAMETER: 3.4 CM
ECHO LV FRACTIONAL SHORTENING: 20 % (ref 28–44)
ECHO LV INTERNAL DIMENSION DIASTOLE INDEX: 1.76 CM/M2
ECHO LV INTERNAL DIMENSION DIASTOLIC: 3.5 CM (ref 3.9–5.3)
ECHO LV INTERNAL DIMENSION SYSTOLIC INDEX: 1.41 CM/M2
ECHO LV INTERNAL DIMENSION SYSTOLIC: 2.8 CM
ECHO LV IVSD: 1.3 CM (ref 0.6–0.9)
ECHO LV MASS 2D: 153.8 G (ref 67–162)
ECHO LV MASS INDEX 2D: 77.3 G/M2 (ref 43–95)
ECHO LV POSTERIOR WALL DIASTOLIC: 1.3 CM (ref 0.6–0.9)
ECHO LV RELATIVE WALL THICKNESS RATIO: 0.74
ECHO LVOT AREA: 2.5 CM2
ECHO LVOT DIAM: 1.8 CM
ECHO MV A VELOCITY: 0.83 M/S
ECHO MV E DECELERATION TIME (DT): 67.1 MS
ECHO MV E VELOCITY: 0.51 M/S
ECHO MV E/A RATIO: 0.61
ECHO MV MAX VELOCITY: 0.8 M/S
ECHO MV MEAN GRADIENT: 1 MMHG
ECHO MV MEAN VELOCITY: 0.4 M/S
ECHO MV PEAK GRADIENT: 3 MMHG
ECHO MV VTI: 16.8 CM
ECHO PV MAX VELOCITY: 0.9 M/S
ECHO PV PEAK GRADIENT: 3 MMHG
ECHO RV FREE WALL PEAK S': 9 CM/S
ECHO RVOT PEAK GRADIENT: 2 MMHG
ECHO RVOT PEAK VELOCITY: 0.7 M/S
ECHO TV REGURGITANT MAX VELOCITY: 2 M/S
ECHO TV REGURGITANT PEAK GRADIENT: 16 MMHG
EOSINOPHIL # BLD: 0 K/UL (ref 0–0.4)
EOSINOPHIL NFR BLD: 0 % (ref 0–7)
ERYTHROCYTE [DISTWIDTH] IN BLOOD BY AUTOMATED COUNT: 12.6 % (ref 11.5–14.5)
GLOBULIN SER CALC-MCNC: 3.8 G/DL (ref 2–4)
GLUCOSE SERPL-MCNC: 265 MG/DL (ref 65–100)
HCT VFR BLD AUTO: 35 % (ref 35–47)
HGB BLD-MCNC: 11 G/DL (ref 11.5–16)
IMM GRANULOCYTES # BLD AUTO: 0 K/UL (ref 0–0.04)
IMM GRANULOCYTES NFR BLD AUTO: 0 % (ref 0–0.5)
LYMPHOCYTES # BLD: 1.3 K/UL (ref 0.8–3.5)
LYMPHOCYTES NFR BLD: 14 % (ref 12–49)
MAGNESIUM SERPL-MCNC: 2 MG/DL (ref 1.6–2.4)
MCH RBC QN AUTO: 28.6 PG (ref 26–34)
MCHC RBC AUTO-ENTMCNC: 31.4 G/DL (ref 30–36.5)
MCV RBC AUTO: 91.1 FL (ref 80–99)
MONOCYTES # BLD: 1 K/UL (ref 0–1)
MONOCYTES NFR BLD: 11 % (ref 5–13)
NEUTS SEG # BLD: 6.7 K/UL (ref 1.8–8)
NEUTS SEG NFR BLD: 74 % (ref 32–75)
NRBC # BLD: 0 K/UL (ref 0–0.01)
NRBC BLD-RTO: 0 PER 100 WBC
NT PRO BNP: 1816 PG/ML
PLATELET # BLD AUTO: 361 K/UL (ref 150–400)
PMV BLD AUTO: 10.4 FL (ref 8.9–12.9)
POTASSIUM SERPL-SCNC: 4.3 MMOL/L (ref 3.5–5.1)
PROT SERPL-MCNC: 6.2 G/DL (ref 6.4–8.2)
RBC # BLD AUTO: 3.84 M/UL (ref 3.8–5.2)
SODIUM SERPL-SCNC: 139 MMOL/L (ref 136–145)
TROPONIN I SERPL HS-MCNC: 12 NG/L (ref 0–51)
WBC # BLD AUTO: 9 K/UL (ref 3.6–11)

## 2024-02-26 PROCEDURE — 99285 EMERGENCY DEPT VISIT HI MDM: CPT

## 2024-02-26 PROCEDURE — 36415 COLL VENOUS BLD VENIPUNCTURE: CPT

## 2024-02-26 PROCEDURE — 85025 COMPLETE CBC W/AUTO DIFF WBC: CPT

## 2024-02-26 PROCEDURE — 6360000002 HC RX W HCPCS: Performed by: INTERNAL MEDICINE

## 2024-02-26 PROCEDURE — 96374 THER/PROPH/DIAG INJ IV PUSH: CPT

## 2024-02-26 PROCEDURE — 6360000002 HC RX W HCPCS: Performed by: STUDENT IN AN ORGANIZED HEALTH CARE EDUCATION/TRAINING PROGRAM

## 2024-02-26 PROCEDURE — 80053 COMPREHEN METABOLIC PANEL: CPT

## 2024-02-26 PROCEDURE — 83880 ASSAY OF NATRIURETIC PEPTIDE: CPT

## 2024-02-26 PROCEDURE — 2580000003 HC RX 258: Performed by: INTERNAL MEDICINE

## 2024-02-26 PROCEDURE — 84484 ASSAY OF TROPONIN QUANT: CPT

## 2024-02-26 PROCEDURE — 1100000003 HC PRIVATE W/ TELEMETRY

## 2024-02-26 PROCEDURE — 83735 ASSAY OF MAGNESIUM: CPT

## 2024-02-26 PROCEDURE — 6360000004 HC RX CONTRAST MEDICATION: Performed by: INTERNAL MEDICINE

## 2024-02-26 PROCEDURE — 6370000000 HC RX 637 (ALT 250 FOR IP): Performed by: INTERNAL MEDICINE

## 2024-02-26 PROCEDURE — 71045 X-RAY EXAM CHEST 1 VIEW: CPT

## 2024-02-26 PROCEDURE — 2580000003 HC RX 258: Performed by: STUDENT IN AN ORGANIZED HEALTH CARE EDUCATION/TRAINING PROGRAM

## 2024-02-26 PROCEDURE — C8929 TTE W OR WO FOL WCON,DOPPLER: HCPCS

## 2024-02-26 PROCEDURE — 71260 CT THORAX DX C+: CPT

## 2024-02-26 PROCEDURE — 93005 ELECTROCARDIOGRAM TRACING: CPT | Performed by: STUDENT IN AN ORGANIZED HEALTH CARE EDUCATION/TRAINING PROGRAM

## 2024-02-26 RX ORDER — ATORVASTATIN CALCIUM 20 MG/1
20 TABLET, FILM COATED ORAL NIGHTLY
Status: DISCONTINUED | OUTPATIENT
Start: 2024-02-26 | End: 2024-03-02 | Stop reason: HOSPADM

## 2024-02-26 RX ORDER — ACETAMINOPHEN 325 MG/1
650 TABLET ORAL EVERY 4 HOURS PRN
Status: DISCONTINUED | OUTPATIENT
Start: 2024-02-26 | End: 2024-02-27

## 2024-02-26 RX ORDER — AMLODIPINE BESYLATE 5 MG/1
5 TABLET ORAL DAILY
Status: DISCONTINUED | OUTPATIENT
Start: 2024-02-27 | End: 2024-03-02 | Stop reason: HOSPADM

## 2024-02-26 RX ORDER — ACETAMINOPHEN 650 MG/1
650 SUPPOSITORY RECTAL EVERY 6 HOURS PRN
Status: DISCONTINUED | OUTPATIENT
Start: 2024-02-26 | End: 2024-03-02 | Stop reason: HOSPADM

## 2024-02-26 RX ORDER — POTASSIUM CHLORIDE 20 MEQ/1
40 TABLET, EXTENDED RELEASE ORAL PRN
Status: DISCONTINUED | OUTPATIENT
Start: 2024-02-26 | End: 2024-02-27

## 2024-02-26 RX ORDER — ONDANSETRON 2 MG/ML
4 INJECTION INTRAMUSCULAR; INTRAVENOUS EVERY 4 HOURS PRN
Status: DISCONTINUED | OUTPATIENT
Start: 2024-02-26 | End: 2024-02-27

## 2024-02-26 RX ORDER — ONDANSETRON 2 MG/ML
4 INJECTION INTRAMUSCULAR; INTRAVENOUS EVERY 6 HOURS PRN
Status: DISCONTINUED | OUTPATIENT
Start: 2024-02-26 | End: 2024-03-02 | Stop reason: HOSPADM

## 2024-02-26 RX ORDER — ACETAMINOPHEN 325 MG/1
650 TABLET ORAL EVERY 6 HOURS PRN
Status: DISCONTINUED | OUTPATIENT
Start: 2024-02-26 | End: 2024-03-02 | Stop reason: HOSPADM

## 2024-02-26 RX ORDER — SODIUM CHLORIDE 9 MG/ML
INJECTION, SOLUTION INTRAVENOUS PRN
Status: DISCONTINUED | OUTPATIENT
Start: 2024-02-26 | End: 2024-03-02 | Stop reason: HOSPADM

## 2024-02-26 RX ORDER — CLOPIDOGREL BISULFATE 75 MG/1
75 TABLET ORAL DAILY
Status: DISCONTINUED | OUTPATIENT
Start: 2024-02-26 | End: 2024-03-02 | Stop reason: HOSPADM

## 2024-02-26 RX ORDER — SODIUM CHLORIDE 0.9 % (FLUSH) 0.9 %
5-40 SYRINGE (ML) INJECTION PRN
Status: DISCONTINUED | OUTPATIENT
Start: 2024-02-26 | End: 2024-03-02 | Stop reason: HOSPADM

## 2024-02-26 RX ORDER — FUROSEMIDE 10 MG/ML
20 INJECTION INTRAMUSCULAR; INTRAVENOUS DAILY
Status: DISCONTINUED | OUTPATIENT
Start: 2024-02-27 | End: 2024-02-27

## 2024-02-26 RX ORDER — POLYETHYLENE GLYCOL 3350 17 G/17G
17 POWDER, FOR SOLUTION ORAL DAILY PRN
Status: DISCONTINUED | OUTPATIENT
Start: 2024-02-26 | End: 2024-03-02 | Stop reason: HOSPADM

## 2024-02-26 RX ORDER — SODIUM CHLORIDE 0.9 % (FLUSH) 0.9 %
5-40 SYRINGE (ML) INJECTION EVERY 12 HOURS SCHEDULED
Status: DISCONTINUED | OUTPATIENT
Start: 2024-02-26 | End: 2024-03-02 | Stop reason: HOSPADM

## 2024-02-26 RX ORDER — MAGNESIUM SULFATE IN WATER 40 MG/ML
2000 INJECTION, SOLUTION INTRAVENOUS PRN
Status: DISCONTINUED | OUTPATIENT
Start: 2024-02-26 | End: 2024-02-27

## 2024-02-26 RX ORDER — FUROSEMIDE 10 MG/ML
20 INJECTION INTRAMUSCULAR; INTRAVENOUS ONCE
Status: COMPLETED | OUTPATIENT
Start: 2024-02-26 | End: 2024-02-26

## 2024-02-26 RX ORDER — POTASSIUM CHLORIDE 1.5 G/1.58G
40 POWDER, FOR SOLUTION ORAL PRN
Status: DISCONTINUED | OUTPATIENT
Start: 2024-02-26 | End: 2024-02-27

## 2024-02-26 RX ORDER — LISINOPRIL 5 MG/1
5 TABLET ORAL DAILY
Status: DISCONTINUED | OUTPATIENT
Start: 2024-02-27 | End: 2024-03-02 | Stop reason: HOSPADM

## 2024-02-26 RX ORDER — OXYCODONE HYDROCHLORIDE 5 MG/1
5 TABLET ORAL EVERY 4 HOURS PRN
Status: DISCONTINUED | OUTPATIENT
Start: 2024-02-26 | End: 2024-03-02 | Stop reason: HOSPADM

## 2024-02-26 RX ORDER — ISOSORBIDE MONONITRATE 30 MG/1
15 TABLET, EXTENDED RELEASE ORAL DAILY
Status: DISCONTINUED | OUTPATIENT
Start: 2024-02-27 | End: 2024-03-02 | Stop reason: HOSPADM

## 2024-02-26 RX ORDER — METHIMAZOLE 5 MG/1
10 TABLET ORAL DAILY
Status: DISCONTINUED | OUTPATIENT
Start: 2024-02-27 | End: 2024-03-02 | Stop reason: HOSPADM

## 2024-02-26 RX ORDER — ONDANSETRON 4 MG/1
4 TABLET, ORALLY DISINTEGRATING ORAL EVERY 8 HOURS PRN
Status: DISCONTINUED | OUTPATIENT
Start: 2024-02-26 | End: 2024-03-02 | Stop reason: HOSPADM

## 2024-02-26 RX ORDER — ASPIRIN 81 MG/1
81 TABLET, CHEWABLE ORAL DAILY
Status: DISCONTINUED | OUTPATIENT
Start: 2024-02-27 | End: 2024-03-02 | Stop reason: HOSPADM

## 2024-02-26 RX ORDER — POTASSIUM CHLORIDE 7.45 MG/ML
10 INJECTION INTRAVENOUS PRN
Status: DISCONTINUED | OUTPATIENT
Start: 2024-02-26 | End: 2024-02-27

## 2024-02-26 RX ORDER — METOPROLOL SUCCINATE 25 MG/1
25 TABLET, EXTENDED RELEASE ORAL DAILY
Status: DISCONTINUED | OUTPATIENT
Start: 2024-02-27 | End: 2024-03-02 | Stop reason: HOSPADM

## 2024-02-26 RX ADMIN — IOPAMIDOL 100 ML: 755 INJECTION, SOLUTION INTRAVENOUS at 15:25

## 2024-02-26 RX ADMIN — FUROSEMIDE 20 MG: 10 INJECTION, SOLUTION INTRAMUSCULAR; INTRAVENOUS at 11:54

## 2024-02-26 RX ADMIN — PERFLUTREN 1.5 ML: 6.52 INJECTION, SUSPENSION INTRAVENOUS at 15:20

## 2024-02-26 RX ADMIN — ACETAMINOPHEN 650 MG: 325 TABLET ORAL at 20:13

## 2024-02-26 RX ADMIN — ATORVASTATIN CALCIUM 20 MG: 40 TABLET, FILM COATED ORAL at 20:13

## 2024-02-26 RX ADMIN — AZITHROMYCIN MONOHYDRATE 500 MG: 500 INJECTION, POWDER, LYOPHILIZED, FOR SOLUTION INTRAVENOUS at 16:20

## 2024-02-26 RX ADMIN — SODIUM CHLORIDE, PRESERVATIVE FREE 10 ML: 5 INJECTION INTRAVENOUS at 20:17

## 2024-02-26 RX ADMIN — SODIUM CHLORIDE 1000 MG: 900 INJECTION INTRAVENOUS at 13:03

## 2024-02-26 ASSESSMENT — PAIN SCALES - GENERAL: PAINLEVEL_OUTOF10: 4

## 2024-02-26 NOTE — H&P
Hospitalist Admission Note    NAME:   Skye Krishnan   : 1965   MRN: [id number]     Date/Time: 2024 2:14 PM    Patient PCP: None, None    ______________________________________________________________________  Given the patient's current clinical presentation, I have a high level of concern for decompensation if discharged from the emergency department.  Complex decision making was performed, which includes reviewing the patient's available past medical records, laboratory results, and x-ray films.       My assessment of this patient's clinical condition and my plan of care is as follows.    Assessment / Plan:      Left moderate-sized pleural effusion  Left midlung patchy airspace disease  CAD  HTN    -3 months history of wheezing/shortness of breath, weight loss of 15 pounds  -proBNP elevated.  WBC normal  -Chest x-ray showed unilateral pleural effusion  -Admit to medical floor  -Get CT chest to rule out malignancy/complex effusion  -Likely will need thoracentesis, but will wait for CT result  -Received Lasix 20 IV in the ED.  Cautious diuresis 20 IV daily for now  -For now continue Rocephin/azithromycin, can de-escalate depending on CT chest  -Consult cardiology  -Continue aspirin, Plavix, statin, Imdur, lisinopril    ?  Hyperthyroidism  Continue methimazole                Medical Decision Making:   I personally reviewed labs: CBC/BMP  I personally reviewed imaging: Chest x-ray  I personally reviewed EKG:  Toxic drug monitoring:   Discussed case with: ED provider. After discussion I am in agreement that acuity of patient's medical condition necessitates hospital stay.      Code Status: Full code  DVT Prophylaxis: SCD  Baseline: Independent    Subjective:   CHIEF COMPLAINT: Shortness of breath    HISTORY OF PRESENT ILLNESS:     Skye Krishnan is a 58 y.o.  female with PMHx significant for HTN, CAD, HLD, diabetes mellitus presented to ED with a chief complaint of cough and shortness of breath.  She    amLODIPine (NORVASC) 5 MG tablet TAKE ONE TABLET BY MOUTH DAILY 21   Automatic Reconciliation, Ar   aspirin 325 MG tablet Take 81 mg by mouth daily    Automatic Reconciliation, Ar   atorvastatin (LIPITOR) 20 MG tablet TAKE ONE TABLET BY MOUTH EVERY NIGHT AT BEDTIME 17   Automatic Reconciliation, Ar   vitamin D 25 MCG (1000 UT) CAPS Take by mouth daily    Automatic Reconciliation, Ar   insulin lispro (HUMALOG) 100 UNIT/ML SOLN injection vial ceived the following from [facility]: Outside name: HUMALOG U-100 INSULIN 100 unit/mL injection 10/27/18   Automatic Reconciliation, Ar   isosorbide mononitrate (IMDUR) 30 MG extended release tablet TAKE 1/2 TABLET BY MOUTH DAILY 12/3/21   Automatic Reconciliation, Ar   lisinopril (PRINIVIL;ZESTRIL) 5 MG tablet Take 1 tablet by mouth daily    Automatic Reconciliation, Ar   methIMAzole (TAPAZOLE) 10 MG tablet Take 1 tablet by mouth    Automatic Reconciliation, Ar   metoprolol succinate (TOPROL XL) 25 MG extended release tablet TAKE ONE TABLET BY MOUTH DAILY 21   Automatic Reconciliation, Ar         Objective:   VITALS:    Patient Vitals for the past 24 hrs:   BP Temp Temp src Pulse Resp SpO2 Height Weight   24 1300 (!) 147/72 -- -- -- -- 97 % -- --   24 1154 112/69 -- -- -- -- -- -- --   24 0950 116/68 98.1 °F (36.7 °C) Oral 87 24 98 % 1.6 m (5' 3\") 97.5 kg (215 lb)       Temp (24hrs), Av.1 °F (36.7 °C), Min:98.1 °F (36.7 °C), Max:98.1 °F (36.7 °C)        O2 Device: None (Room air)    Wt Readings from Last 12 Encounters:   24 97.5 kg (215 lb)   23 97.5 kg (215 lb)   21 97.8 kg (215 lb 11.2 oz)         PHYSICAL EXAM:  General:    Alert, cooperative, appears stated age.     Lungs:   Decreased entry bilateral bases, coarse sounds  Chest wall:  No tenderness.  No accessory muscle use.  Heart:   Regular  rhythm,  No  Murmur.   No edema  Abdomen:   Soft, NT. ND  BS+  Extremities: No cyanosis.  No clubbing,

## 2024-02-26 NOTE — ED PROVIDER NOTES
[facility] EMERGENCY DEPT  EMERGENCY DEPARTMENT ENCOUNTER       Pt Name: Skye Krishnan  MRN: [id number]  Birthdate 1965  Date of evaluation: 2/26/2024  Provider: Perry Armendariz MD   PCP: None, None  Note Started: 2:37 PM 2/26/24     CHIEF COMPLAINT       Chief Complaint   Patient presents with    Shortness of Breath     [facility] a half ago with shortness of breath and found out on the lung. Given antibiotics which is not completed. Also taking mucinex        HISTORY OF PRESENT ILLNESS: 1 or more elements      History From: Patient  None     Skye Krishnan is a 58 y.o. female who presents to the emergency department with 2 weeks of worsening shortness of breath and dyspnea on exertion.  Patient reports that [facility], x-ray reportedly showed fluid on her lung.  Outpatient CT scan was ordered, patient scheduled to get this tomorrow but she felt very short of breath this morning even ambulated a short distance.  Patient denies any associated leg swelling or orthopnea.  Reports occasional left-sided chest pain which does not seem to be exertional or pleuritic, not present currently.  Patient underwent catheterization and August of last year, had stent placed     Nursing Notes were all reviewed and agreed with or any disagreements were addressed in the HPI.     REVIEW OF SYSTEMS      Review of Systems     Positives and Pertinent negatives as per HPI.    PAST HISTORY     Past Medical History:  Past Medical History:   Diagnosis Date    Chronic kidney disease     elevated potassiums    Coronary atherosclerosis of native coronary artery     Coronary atherosclerosis of native coronary artery     Diabetes (HCC)     Essential hypertension     Essential hypertension, benign     Heart failure (HCC)     bypass 6yrs ago    Hyperlipidemia     Hypertension     Morbid obesity (HCC)     Nausea & vomiting     Thyroid disease          Past Surgical History:  Past Surgical History:

## 2024-02-27 ENCOUNTER — APPOINTMENT (OUTPATIENT)
Facility: HOSPITAL | Age: 59
End: 2024-02-27
Payer: COMMERCIAL

## 2024-02-27 LAB
ANION GAP SERPL CALC-SCNC: 9 MMOL/L (ref 5–15)
APPEARANCE FLD: CLEAR
BODY FLD TYPE: NORMAL
BUN SERPL-MCNC: 24 MG/DL (ref 6–20)
BUN/CREAT SERPL: 18 (ref 12–20)
CALCIUM SERPL-MCNC: 9 MG/DL (ref 8.5–10.1)
CHLORIDE SERPL-SCNC: 110 MMOL/L (ref 97–108)
CO2 SERPL-SCNC: 19 MMOL/L (ref 21–32)
COLOR FLD: YELLOW
COMMENT:: NORMAL
CREAT SERPL-MCNC: 1.37 MG/DL (ref 0.55–1.02)
EKG ATRIAL RATE: 86 BPM
EKG DIAGNOSIS: NORMAL
EKG P AXIS: 21 DEGREES
EKG P-R INTERVAL: 144 MS
EKG Q-T INTERVAL: 362 MS
EKG QRS DURATION: 82 MS
EKG QTC CALCULATION (BAZETT): 433 MS
EKG R AXIS: 36 DEGREES
EKG T AXIS: 129 DEGREES
EKG VENTRICULAR RATE: 86 BPM
EOSINOPHIL NFR FLD MANUAL: 1 %
GLUCOSE FLD-MCNC: 271 MG/DL
GLUCOSE SERPL-MCNC: 239 MG/DL (ref 65–100)
LDH FLD L TO P-CCNC: 155 U/L
LYMPHOCYTES NFR FLD: 21 %
MAGNESIUM SERPL-MCNC: 2 MG/DL (ref 1.6–2.4)
MONOS+MACROS NFR FLD: 64 %
NEUTROPHILS NFR FLD: 14 %
NUC CELL # FLD: 124 /CU MM
PH FLD: 6.8
POTASSIUM SERPL-SCNC: 4.5 MMOL/L (ref 3.5–5.1)
PROCALCITONIN SERPL-MCNC: 0.17 NG/ML
PROT FLD-MCNC: 4.9 G/DL
RBC # FLD: 50 /CU MM
SODIUM SERPL-SCNC: 138 MMOL/L (ref 136–145)
SPECIMEN HOLD: NORMAL
SPECIMEN SOURCE FLD: ABNORMAL
SPECIMEN SOURCE FLD: NORMAL

## 2024-02-27 PROCEDURE — 36415 COLL VENOUS BLD VENIPUNCTURE: CPT

## 2024-02-27 PROCEDURE — 6370000000 HC RX 637 (ALT 250 FOR IP): Performed by: INTERNAL MEDICINE

## 2024-02-27 PROCEDURE — 87070 CULTURE OTHR SPECIMN AEROBIC: CPT

## 2024-02-27 PROCEDURE — 88112 CYTOPATH CELL ENHANCE TECH: CPT

## 2024-02-27 PROCEDURE — 32555 ASPIRATE PLEURA W/ IMAGING: CPT

## 2024-02-27 PROCEDURE — 2580000003 HC RX 258: Performed by: INTERNAL MEDICINE

## 2024-02-27 PROCEDURE — 1100000003 HC PRIVATE W/ TELEMETRY

## 2024-02-27 PROCEDURE — 89050 BODY FLUID CELL COUNT: CPT

## 2024-02-27 PROCEDURE — 75557 CARDIAC MRI FOR MORPH: CPT | Performed by: INTERNAL MEDICINE

## 2024-02-27 PROCEDURE — 84157 ASSAY OF PROTEIN OTHER: CPT

## 2024-02-27 PROCEDURE — 87205 SMEAR GRAM STAIN: CPT

## 2024-02-27 PROCEDURE — 88305 TISSUE EXAM BY PATHOLOGIST: CPT

## 2024-02-27 PROCEDURE — 83615 LACTATE (LD) (LDH) ENZYME: CPT

## 2024-02-27 PROCEDURE — 82945 GLUCOSE OTHER FLUID: CPT

## 2024-02-27 PROCEDURE — 6360000002 HC RX W HCPCS: Performed by: INTERNAL MEDICINE

## 2024-02-27 PROCEDURE — 83735 ASSAY OF MAGNESIUM: CPT

## 2024-02-27 PROCEDURE — 71045 X-RAY EXAM CHEST 1 VIEW: CPT

## 2024-02-27 PROCEDURE — 80048 BASIC METABOLIC PNL TOTAL CA: CPT

## 2024-02-27 PROCEDURE — 83986 ASSAY PH BODY FLUID NOS: CPT

## 2024-02-27 PROCEDURE — 84145 PROCALCITONIN (PCT): CPT

## 2024-02-27 PROCEDURE — 2500000003 HC RX 250 WO HCPCS: Performed by: INTERNAL MEDICINE

## 2024-02-27 PROCEDURE — 75557 CARDIAC MRI FOR MORPH: CPT

## 2024-02-27 RX ORDER — SODIUM CHLORIDE 0.9 % (FLUSH) 0.9 %
5-40 SYRINGE (ML) INJECTION PRN
Status: DISCONTINUED | OUTPATIENT
Start: 2024-02-27 | End: 2024-02-27

## 2024-02-27 RX ORDER — SODIUM CHLORIDE 0.9 % (FLUSH) 0.9 %
5-40 SYRINGE (ML) INJECTION EVERY 12 HOURS SCHEDULED
Status: DISCONTINUED | OUTPATIENT
Start: 2024-02-27 | End: 2024-02-27

## 2024-02-27 RX ORDER — LIDOCAINE HYDROCHLORIDE 10 MG/ML
10 INJECTION, SOLUTION EPIDURAL; INFILTRATION; INTRACAUDAL; PERINEURAL ONCE
Status: COMPLETED | OUTPATIENT
Start: 2024-02-27 | End: 2024-02-27

## 2024-02-27 RX ORDER — SODIUM CHLORIDE 9 MG/ML
INJECTION, SOLUTION INTRAVENOUS PRN
Status: DISCONTINUED | OUTPATIENT
Start: 2024-02-27 | End: 2024-03-02 | Stop reason: HOSPADM

## 2024-02-27 RX ADMIN — ACETAMINOPHEN 650 MG: 325 TABLET ORAL at 21:16

## 2024-02-27 RX ADMIN — ISOSORBIDE MONONITRATE 15 MG: 30 TABLET, EXTENDED RELEASE ORAL at 08:31

## 2024-02-27 RX ADMIN — FUROSEMIDE 20 MG: 10 INJECTION, SOLUTION INTRAMUSCULAR; INTRAVENOUS at 08:32

## 2024-02-27 RX ADMIN — LISINOPRIL 5 MG: 5 TABLET ORAL at 08:32

## 2024-02-27 RX ADMIN — SODIUM CHLORIDE, PRESERVATIVE FREE 10 ML: 5 INJECTION INTRAVENOUS at 08:33

## 2024-02-27 RX ADMIN — ATORVASTATIN CALCIUM 20 MG: 40 TABLET, FILM COATED ORAL at 21:16

## 2024-02-27 RX ADMIN — LIDOCAINE HYDROCHLORIDE 10 ML: 10 INJECTION, SOLUTION EPIDURAL; INFILTRATION; INTRACAUDAL; PERINEURAL at 15:38

## 2024-02-27 RX ADMIN — AMLODIPINE BESYLATE 5 MG: 5 TABLET ORAL at 08:32

## 2024-02-27 RX ADMIN — SODIUM CHLORIDE 50 ML: 9 INJECTION, SOLUTION INTRAVENOUS at 08:41

## 2024-02-27 RX ADMIN — AZITHROMYCIN MONOHYDRATE 500 MG: 500 INJECTION, POWDER, LYOPHILIZED, FOR SOLUTION INTRAVENOUS at 16:26

## 2024-02-27 RX ADMIN — CLOPIDOGREL BISULFATE 75 MG: 75 TABLET ORAL at 21:16

## 2024-02-27 RX ADMIN — SODIUM CHLORIDE 1000 MG: 900 INJECTION INTRAVENOUS at 08:49

## 2024-02-27 RX ADMIN — METHIMAZOLE 10 MG: 5 TABLET ORAL at 08:32

## 2024-02-27 RX ADMIN — METOPROLOL SUCCINATE 25 MG: 25 TABLET, EXTENDED RELEASE ORAL at 08:32

## 2024-02-27 RX ADMIN — SODIUM CHLORIDE, PRESERVATIVE FREE 10 ML: 5 INJECTION INTRAVENOUS at 21:17

## 2024-02-27 RX ADMIN — ASPIRIN 81 MG: 81 TABLET, CHEWABLE ORAL at 08:38

## 2024-02-27 NOTE — CONSULTS
Coronary atherosclerosis of native coronary artery     Coronary atherosclerosis of native coronary artery     Diabetes (HCC)     Essential hypertension     Essential hypertension, benign     Heart failure (HCC)     bypass 6yrs ago    Hyperlipidemia     Hypertension     Morbid obesity (HCC)     Nausea & vomiting     Thyroid disease       Social History     Socioeconomic History    Marital status:    Tobacco Use    Smoking status: Never    Smokeless tobacco: Never   Substance and Sexual Activity    Alcohol use: Not Currently     Alcohol/week: 0.0 standard drinks of alcohol    Drug use: Never     No Known Allergies   No family history on file.   Current Facility-Administered Medications   Medication Dose Route Frequency    sodium chloride flush 0.9 % injection 5-40 mL  5-40 mL IntraVENous 2 times per day    sodium chloride flush 0.9 % injection 5-40 mL  5-40 mL IntraVENous PRN    0.9 % sodium chloride infusion   IntraVENous PRN    oxyCODONE (ROXICODONE) immediate release tablet 5 mg  5 mg Oral Q4H PRN    acetaminophen (TYLENOL) tablet 650 mg  650 mg Oral Q4H PRN    ondansetron (ZOFRAN) injection 4 mg  4 mg IntraVENous Q4H PRN    sodium chloride flush 0.9 % injection 5-40 mL  5-40 mL IntraVENous 2 times per day    sodium chloride flush 0.9 % injection 5-40 mL  5-40 mL IntraVENous PRN    0.9 % sodium chloride infusion   IntraVENous PRN    potassium chloride (KLOR-CON M) extended release tablet 40 mEq  40 mEq Oral PRN    Or    potassium chloride (KLOR-CON) 20 MEQ packet 40 mEq  40 mEq Oral PRN    Or    potassium chloride 10 mEq/100 mL IVPB (Peripheral Line)  10 mEq IntraVENous PRN    magnesium sulfate 2000 mg in 50 mL IVPB premix  2,000 mg IntraVENous PRN    ondansetron (ZOFRAN-ODT) disintegrating tablet 4 mg  4 mg Oral Q8H PRN    Or    ondansetron (ZOFRAN) injection 4 mg  4 mg IntraVENous Q6H PRN    polyethylene glycol (GLYCOLAX) packet 17 g  17 g Oral Daily PRN    acetaminophen (TYLENOL) tablet 650 mg  650 mg  1049  Last data filed at 2/27/2024 0725  Gross per 24 hour   Intake 550 ml   Output --   Net 550 ml        Cardiographics    Telemetry: Unremarkable  ECG: Unremarkable    Echocardiogram:  02/26/24    ECHO (TTE) COMPLETE (PRN CONTRAST/BUBBLE/STRAIN/3D) 02/26/2024  3:34 PM (Final)    Interpretation Summary    Left Ventricle: Normal left ventricular systolic function with a visually estimated EF of 55 - 60%. Not well visualized. Left ventricle is smaller than normal. Increased wall thickness. Normal wall motion. There is a possible mass present consistent with thrombus.    Right Ventricle: Not well visualized.    Image quality is suboptimal. Contrast used: Definity. Procedure performed with the patient in a sitting position.    Signed by: Collins Gold MD on 2/26/2024  3:34 PM      Stress Test:  July 2023 in the office followed by left heart catheterization where stent was placed in the vein graft to 32s marginal       CXRAY:New moderate-sized left pleural effusion and left midlung patchy airspace opacity and left basilar consolidation       Assessment:   58-year-old patient with pneumonia and suspected parapneumonic effusion.  Feeling improved on the current regimen.  No indication of acute coronary syndrome.  Continue management of respiratory conditions.    Other fluid overload: Recommend de-escalation of diuretic therapy.    Coronary artery disease, status post coronary artery bypass surgery: Continue aspirin 81, atorvastatin 20 for now, continue clopidogrel 75 (less than 1 year since last intervention.  She is also on low-dose isosorbide mononitrate and lisinopril.  Continue metoprolol for now.    Left ventricle thrombus: Unclear on echo, will get cardiac MRI    Thank you for allowing us to participate in the care of this patient.  We will follow.    Collins Gold MD  CC:None, None

## 2024-02-27 NOTE — PROGRESS NOTES
MRI PENDING    Completion of MRI Screening Sheet    Fax to [phone] when completed    Please call [phone]  When this is done    Thank You

## 2024-02-27 NOTE — CARE COORDINATION
Care Management Initial Assessment       RUR:11%  Readmission? No  1st IM letter given? No  1st  letter given: No     02/27/24 1654   Service Assessment   Patient Orientation Alert and Oriented   Cognition Alert   History Provided By Other (see comment)   Primary Caregiver Self   Support Systems Spouse/Significant Other   Patient's Healthcare Decision Maker is: Legal Next of Kin   PCP Verified by CM Yes   Last Visit to PCP Within last 3 months   Prior Functional Level Independent in ADLs/IADLs   Current Functional Level Independent in ADLs/IADLs   Can patient return to prior living arrangement Yes   Ability to make needs known: Good   Family able to assist with home care needs: Yes   Would you like for me to discuss the discharge plan with any other family members/significant others, and if so, who? Yes   Financial Resources None   Community Resources None   Social/Functional History   Lives With Significant other   Type of Home House   Home Equipment None   Receives Help From Family   ADL Assistance Independent   Homemaking Assistance Independent   Homemaking Responsibilities Yes   Ambulation Assistance Independent   Transfer Assistance Independent   Active  Yes   Mode of Transportation Car   Occupation Full time employment   Discharge Planning   Type of Residence House   Living Arrangements Spouse/Significant Other   Current Services Prior To Admission None   Potential Assistance Needed N/A   DME Ordered? No   Potential Assistance Purchasing Medications No   Type of Home Care Services None   Patient expects to be discharged to: House   History of falls? 0   Services At/After Discharge   Transition of Care Consult (CM Consult) N/A   Services At/After Discharge None   [place] Resource Information Provided? No   Mode of Transport at Discharge Other (see comment)  (family)   Confirm Follow Up Transport Family     CM spoke with patient's emergency contact, Hakeem Jackson and verified patient's demographics,  insurance, and PCP. Patient lives with her significant other and is independent in her ADLs/IADLs with no DME. Patient uses [employer] at [facility].           Siomara Juares  Care Manager

## 2024-02-27 NOTE — CONSULTS
Pulmonary, Critical Care, and Sleep Medicine~Consult Note    Name: Skye Krishnan MRN: [id number]   : 1965 Hospital: [facility]   Date: 2024 11:39 AM Admission: 2024     IMPRESSION:   Large left pleural effusion, ? parapneumonic  Diagnosed influenza 2023, CAP 1-2 weeks ago treated w/ doxycycline      PLAN:   Supplemental o2 if needed  Thoracentesis, f/u pleural fluid studies  Agree w/ abx  Concerns for possible thrombus on echo, cardiac MRI pending - further evaluation per primary team/cardiology     Daily Progression:    59yo female nonsmoker w/ HTN, CAD s/p CABG, stent 2023, hyperthyroidism, DM, CKD. Admitted 24 w/ worsening cough, dyspnea. Dyspnea the last 3 months, progressively worsening. Was diagnosed w/ influenza in November. Cough progressively worsened. Cough minimally productive. Clear sputum. No fevers. Periodic sweats usually when blood sugar low. No increase. ? Weight loss. Lower weight here, about 15lbs but she had not noticed any changes at home. No cough/dyspnea prior to November. Thinks she had CXR at [facility] ED in , was not told there was any abnormality. CXR done at [facility] a couple of weeks ago, she was told she had pneumonia. She was given doxycycline for about a week. Will try to view images [facility]. Lifelong nonsmoker. No history of asthma. Usual spring allergies. No antihistamine. 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 Lots of fumes. No recommended masks.She feels a clear difference in symptoms when she works on the cutting table. No o2, CPAP, inhalers at home. She has not previously seen a pulmonologist but had scheduled an appt w/ Dr. Jaramillo, supposed to see him in the next couple of weeks. Cardiologist Dr. Layton. Father had lung cancer - he was a pipe smoker.     Resting in bed. No distress. On RA. Dry cough w/ coughing.  Q6H PRN    Or    acetaminophen (TYLENOL) suppository 650 mg  650 mg Rectal Q6H PRN    amLODIPine (NORVASC) tablet 5 mg  5 mg Oral Daily    aspirin chewable tablet 81 mg  81 mg Oral Daily    atorvastatin (LIPITOR) tablet 20 mg  20 mg Oral Nightly    clopidogrel (PLAVIX) tablet 75 mg  75 mg Oral Daily    isosorbide mononitrate (IMDUR) extended release tablet 15 mg  15 mg Oral Daily    lisinopril (PRINIVIL;ZESTRIL) tablet 5 mg  5 mg Oral Daily    methIMAzole (TAPAZOLE) tablet 10 mg  10 mg Oral Daily    metoprolol succinate (TOPROL XL) extended release tablet 25 mg  25 mg Oral Daily    furosemide (LASIX) injection 20 mg  20 mg IntraVENous Daily    cefTRIAXone (ROCEPHIN) 1,000 mg in sodium chloride 0.9 % 50 mL IVPB (mini-bag)  1,000 mg IntraVENous Q24H    azithromycin (ZITHROMAX) 500 mg in sodium chloride 0.9 % 250 mL IVPB (Lqmg4Tww)  500 mg IntraVENous Q24H       Labs:  ABG Invalid input(s): \"PHI\", \"PCO2I\", \"PO2I\", \"HCO3I\", \"SO2I\", \"FIO2I\"     CBC Recent Labs     02/26/24  1003   WBC 9.0   HGB 11.0*   HCT 35.0      MCV 91.1   MCH 28.6        Metabolic  Panel Recent Labs     02/26/24  1003 02/27/24  0636    138   K 4.3 4.5   * 110*   CO2 23 19*   BUN 20 24*   MG 2.0 2.0   ALT 38  --         Pertinent Labs                Katiuska Gonsales APRN - NP

## 2024-02-27 NOTE — PROGRESS NOTES
Name of Procedure: image guided thoracentesis     Vital Signs:  VSS throughout     Fluids Removed:   1500 ml Hazy yellow pleural fluid      Samples sent to lab: yes     Any complications related to procedure: none identified at this time     Patient is A&Ox4, on RA, and is in NAD at this time. Postprocedure chest xray completed.

## 2024-02-27 NOTE — PROGRESS NOTES
Hospitalist Progress Note    NAME:   Skye Krishnan   : 1965   MRN: [id number]     Date/Time: 2024 4:14 PM  Patient PCP: None, None    Estimated discharge date:  Barriers:       Assessment / Plan:    Left moderate-sized pleural effusion  Left midlung patchy airspace disease  CAD  HTN     -3 months history of wheezing/shortness of breath, weight loss of 15 pounds  -proBNP elevated.  WBC normal  -Chest x-ray showed unilateral pleural effusion    -plan for  thoracentesis  -Rocephin/azithromycin, can de-escalate depending on CT chest  -Consult cardiology  -consult pulmonary   -Continue aspirin, Plavix, statin, Imdur, lisinopril     ?  Hyperthyroidism  Continue methimazole             Medical Decision Making:   I personally reviewed labs:CBC and BMP   I personally reviewed imaging:CXR  I personally reviewed EKG:yes   Toxic drug monitoring:   Discussed case with: Nurse and IDR         Code Status: Full   DVT Prophylaxis: Lovenox   GI Prophylaxis:    Subjective:     Chief Complaint / Reason for Physician Visit  \"\".  Discussed with RN events overnight.       Objective:     VITALS:   Last 24hrs VS reviewed since prior progress note. Most recent are:  Patient Vitals for the past 24 hrs:   BP Temp Temp src Pulse Resp SpO2 Weight   24 1515 (!) 125/50 -- -- 93 -- 98 % --   24 1505 (!) 130/94 -- -- 92 -- 99 % --   24 1455 125/67 -- -- 80 -- 98 % --   24 1445 137/84 -- -- 97 -- 98 % --   24 1202 -- 98.2 °F (36.8 °C) Oral -- -- -- --   24 1201 137/87 98.2 °F (36.8 °C) Oral 90 16 97 % --   24 0800 -- -- -- 100 -- -- --   24 0725 (!) 150/66 98.1 °F (36.7 °C) Oral 91 15 100 % --   24 0655 -- -- -- -- -- -- 88.4 kg (194 lb 14.2 oz)   24 0601 -- -- -- -- -- -- 88.4 kg (194 lb 14.2 oz)   24 0358 (!) 149/64 97.6 °F (36.4 °C) Oral 83 -- 98 % --   24 0244 (!) 151/88 98.4 °F (36.9 °C) Oral 77 -- 96 % --   24 0005 (!) 145/87 98.3 °F (36.8 °C)

## 2024-02-28 LAB — CYTOLOGY-NON GYN: NORMAL

## 2024-02-28 PROCEDURE — 6360000002 HC RX W HCPCS: Performed by: STUDENT IN AN ORGANIZED HEALTH CARE EDUCATION/TRAINING PROGRAM

## 2024-02-28 PROCEDURE — 6360000002 HC RX W HCPCS: Performed by: NURSE PRACTITIONER

## 2024-02-28 PROCEDURE — 6370000000 HC RX 637 (ALT 250 FOR IP): Performed by: INTERNAL MEDICINE

## 2024-02-28 PROCEDURE — 36415 COLL VENOUS BLD VENIPUNCTURE: CPT

## 2024-02-28 PROCEDURE — 87040 BLOOD CULTURE FOR BACTERIA: CPT

## 2024-02-28 PROCEDURE — 2580000003 HC RX 258: Performed by: INTERNAL MEDICINE

## 2024-02-28 PROCEDURE — 1100000003 HC PRIVATE W/ TELEMETRY

## 2024-02-28 PROCEDURE — 2580000003 HC RX 258: Performed by: STUDENT IN AN ORGANIZED HEALTH CARE EDUCATION/TRAINING PROGRAM

## 2024-02-28 PROCEDURE — 6360000002 HC RX W HCPCS: Performed by: INTERNAL MEDICINE

## 2024-02-28 RX ORDER — METRONIDAZOLE 500 MG/100ML
500 INJECTION, SOLUTION INTRAVENOUS EVERY 8 HOURS
Status: DISCONTINUED | OUTPATIENT
Start: 2024-02-28 | End: 2024-03-02

## 2024-02-28 RX ADMIN — LISINOPRIL 5 MG: 5 TABLET ORAL at 09:52

## 2024-02-28 RX ADMIN — ATORVASTATIN CALCIUM 20 MG: 40 TABLET, FILM COATED ORAL at 19:59

## 2024-02-28 RX ADMIN — SODIUM CHLORIDE 1000 MG: 900 INJECTION INTRAVENOUS at 09:58

## 2024-02-28 RX ADMIN — SODIUM CHLORIDE, PRESERVATIVE FREE 10 ML: 5 INJECTION INTRAVENOUS at 19:59

## 2024-02-28 RX ADMIN — SODIUM CHLORIDE, PRESERVATIVE FREE 10 ML: 5 INJECTION INTRAVENOUS at 10:01

## 2024-02-28 RX ADMIN — SODIUM CHLORIDE 250 ML: 9 INJECTION, SOLUTION INTRAVENOUS at 09:57

## 2024-02-28 RX ADMIN — METRONIDAZOLE 500 MG: 500 INJECTION, SOLUTION INTRAVENOUS at 17:52

## 2024-02-28 RX ADMIN — ISOSORBIDE MONONITRATE 15 MG: 30 TABLET, EXTENDED RELEASE ORAL at 09:53

## 2024-02-28 RX ADMIN — METHIMAZOLE 10 MG: 5 TABLET ORAL at 09:53

## 2024-02-28 RX ADMIN — AZITHROMYCIN MONOHYDRATE 500 MG: 500 INJECTION, POWDER, LYOPHILIZED, FOR SOLUTION INTRAVENOUS at 15:16

## 2024-02-28 RX ADMIN — METOPROLOL SUCCINATE 25 MG: 25 TABLET, EXTENDED RELEASE ORAL at 09:53

## 2024-02-28 RX ADMIN — METRONIDAZOLE 500 MG: 500 INJECTION, SOLUTION INTRAVENOUS at 09:59

## 2024-02-28 RX ADMIN — CLOPIDOGREL BISULFATE 75 MG: 75 TABLET ORAL at 19:59

## 2024-02-28 RX ADMIN — ASPIRIN 81 MG: 81 TABLET, CHEWABLE ORAL at 10:00

## 2024-02-28 RX ADMIN — AMLODIPINE BESYLATE 5 MG: 5 TABLET ORAL at 10:09

## 2024-02-28 NOTE — PROGRESS NOTES
Comprehensive Nutrition Assessment    Type and Reason for Visit:  Initial, Positive Nutrition Screen    Nutrition Recommendations/Plan:   Continue current diet  Ensure high protein BID  Please document % meals and supplements consumed in flowsheet I/O's under intake      Malnutrition Assessment:  Malnutrition Status:  Insufficient data (02/28/24 1650)        Nutrition Assessment:     Chart reviewed for MST. Pt medically noted for pleural effusion, patchy airspace disease, CAD, HTN, 3 months of SOB with 15# wt loss. Pt receiving nursing care at time of RD visit. Fair PO intake documented through admission so far. Per EMR, she may have lost up to 21# (9.8%) since August, which would be severe for the time frame. Will need full interview and assessment to determine malnutrition status. Will add supplements and follow up.     Patient Vitals for the past 120 hrs:   PO Meals Eaten (%)   02/28/24 0759 51 - 75%   02/27/24 1252 76 - 100%   02/27/24 0849 51 - 75%   02/27/24 0725 51 - 75%     Wt Readings from Last 5 Encounters:   02/28/24 88.3 kg (194 lb 10.7 oz)   08/31/23 97.5 kg (215 lb)   12/07/21 97.8 kg (215 lb 11.2 oz)   ]    Nutrition Related Findings:    No labs today.   Meds: lipitor, zihromax, rocephin, flagyl.   Edema: 1+ pitting BLE.   BM PTA.   Wound Type: None       Current Nutrition Intake & Therapies:    Average Meal Intake: Unable to assess  Average Supplements Intake: None Ordered  ADULT DIET; Regular; 3 carb choices (45 gm/meal)    Anthropometric Measures:  Height: 160 cm (5' 2.99\")  Ideal Body Weight (IBW): 115 lbs (52 kg)       Current Body Weight: 88.3 kg (194 lb 10.7 oz), 169.3 % IBW. Weight Source: Standing Scale  Current BMI (kg/m2): 34.5        Weight Adjustment For: No Adjustment                 BMI Categories: Obese Class 1 (BMI 30.0-34.9)    Estimated Daily Nutrient Needs:  Energy Requirements Based On: Formula  Weight Used for Energy Requirements: Current  Energy (kcal/day): 1556 kcals (BMR x

## 2024-02-28 NOTE — CARE COORDINATION
Patient is clear from a CM standpoint.    Transition of Care Plan: Home with follow ups.    RUR: 11% (low RUR)  Prior Level of Functioning: Independent  Disposition: Home with follow ups.  If SNF or IPR: Date FOC offered: N/A  Date FOC received: N/A  Accepting facility: N/A  Date authorization started with reference number: N/A  Date authorization received and expires: N/A  Follow up appointments: PCP/specialists if needed.  DME needed: None.  Transportation at discharge: Family.  IM/IMM Medicare/ letter given: N/A Gregorio MELARABS  Is patient a [place] and connected with [facility]? No.   If yes, was  transfer form completed and VA notified? N/A  Caregiver Contact: Hakeem Jackson - bart - [phone]  Discharge Caregiver contacted prior to discharge? Patient to contact.  Care Conference needed? No.  Barriers to discharge: pulm/cards clearance    1244 - Patient wants new PCP to be arranged near [facility], if possible. CM Team to try to assist with this request.  [facility] information left on AVS.     02/28/24 1156   Services At/After Discharge   Transition of Care Consult (CM Consult) N/A   Services At/After Discharge None   Mode of Transport at Discharge Other (see comment)  (Family)   Confirm Follow Up Transport Family   Condition of Participation: Discharge Planning   The Plan for Transition of Care is related to the following treatment goals: Return home         ARACELIS Millre, RN    Care Management  [phone]

## 2024-02-28 NOTE — PROGRESS NOTES
1900: Bedside and Verbal shift change report given to TIFFANY Abbasi (oncoming nurse) by TIFFANY Monk (offgoing nurse). Report included the following information Nurse Handoff Report, Index, Adult Overview, Intake/Output, MAR, Recent Results, and Cardiac Rhythm NSR .     End of Shift Note    Bedside shift change report given to TIFFANY Monk (oncoming nurse) by Rudolph Meza RN (offgoing nurse).  Report included the following information SBAR, Kardex, Intake/Output, MAR, Recent Results, and Cardiac Rhythm NSR    Shift worked:  7p-7:30a     Shift summary and any significant changes:          Concerns for physician to address:       Zone phone for oncoming shift:          Activity:     Number times ambulated in hallways past shift: 0  Number of times OOB to chair past shift: 0    Cardiac:   Cardiac Monitoring: Yes           Access:  Current line(s): PIV     Genitourinary:   Urinary status: voiding/external cath    Respiratory:      Chronic home O2 use?: NO  Incentive spirometer at bedside: NO       GI:     Current diet:  ADULT DIET; Regular; 3 carb choices (45 gm/meal)  Passing flatus: YES  Tolerating current diet: YES       Pain Management:   Patient states pain is manageable on current regimen: YES    Skin:     Interventions: specialty bed    Patient Safety:  Fall Score:    Interventions: bed/chair alarm, assistive device (walker, cane. etc), gripper socks, pt to call before getting OOB, and stay with me (per policy)       Length of Stay:  Expected LOS: 3  Actual LOS: 2      Rudolph Meza RN

## 2024-02-28 NOTE — PROGRESS NOTES
Cardiac MRI rhythm no indication of left ventricular thrombus that appears to be an artifact on echocardiogram.  Continue management of underlying conditions.  Okay for outpatient follow-up from cardiology standpoint.  Thank you for allowing us to participate in the care of this patient.  Please call with questions.

## 2024-02-28 NOTE — PLAN OF CARE
Problem: Safety - Adult  Goal: Free from fall injury  Outcome: Progressing  Note: Pt has been free from fall injury. Call bell within reach. Bed alarm refusal signed.

## 2024-02-28 NOTE — PROGRESS NOTES
Pulmonary, Critical Care, and Sleep Medicine~Consult Note    Name: Skye Krishnan MRN: [id number]   : 1965 Hospital: [facility]   Date: 2024 9:12 AM Admission: 2024     IMPRESSION:   Large left pleural effusion, complicated parapneumonic  Diagnosed influenza 2023, CAP 1-2 weeks ago treated w/ doxycycline      PLAN:   Supplemental o2 if needed  Adjusted abx, anaerobic coverage  Monitor for recurrence of effusion     Daily Progression:    24 : Dyspnea much improved. Cough less frequent. Tmax 100.2. On RA. Cardiac MRI negative for thrombus.     57yo female nonsmoker w/ HTN, CAD s/p CABG, stent 2023, hyperthyroidism, DM, CKD. Admitted 24 w/ worsening cough, dyspnea. Dyspnea the last 3 months, progressively worsening. Was diagnosed w/ influenza in November. Cough progressively worsened. Cough minimally productive. Clear sputum. No fevers. Periodic sweats usually when blood sugar low. No increase. ? Weight loss. Lower weight here, about 15lbs but she had not noticed any changes at home. No cough/dyspnea prior to November. Thinks she had CXR at [facility] ED in , was not told there was any abnormality. CXR done at [facility] a couple of weeks ago, she was told she had pneumonia. She was given doxycycline for about a week. Will try to view images [facility]. Lifelong nonsmoker. No history of asthma. Usual spring allergies. No antihistamine. 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 Lots of fumes. No recommended masks.She feels a clear difference in symptoms when she works on the cutting table. No o2, CPAP, inhalers at home. She has not previously seen a pulmonologist but had scheduled an appt w/ Dr. Jaramillo, supposed to see him in the next couple of weeks. Cardiologist Dr. Layton. Father had lung cancer - he was a pipe smoker.     Resting in bed. No distress. On RA.  tablet 5 mg  5 mg Oral Daily    methIMAzole (TAPAZOLE) tablet 10 mg  10 mg Oral Daily    metoprolol succinate (TOPROL XL) extended release tablet 25 mg  25 mg Oral Daily    cefTRIAXone (ROCEPHIN) 1,000 mg in sodium chloride 0.9 % 50 mL IVPB (mini-bag)  1,000 mg IntraVENous Q24H    azithromycin (ZITHROMAX) 500 mg in sodium chloride 0.9 % 250 mL IVPB ([id number])  500 mg IntraVENous Q24H       Labs:  ABG Invalid input(s): \"PHI\", \"PCO2I\", \"PO2I\", \"HCO3I\", \"SO2I\", \"FIO2I\"     CBC Recent Labs     02/26/24  1003   WBC 9.0   HGB 11.0*   HCT 35.0      MCV 91.1   MCH 28.6          Metabolic  Panel Recent Labs     02/26/24  1003 02/27/24  0636    138   K 4.3 4.5   * 110*   CO2 23 19*   BUN 20 24*   MG 2.0 2.0   ALT 38  --           Pertinent Labs                KWAME Vee - NP

## 2024-02-29 ENCOUNTER — APPOINTMENT (OUTPATIENT)
Facility: HOSPITAL | Age: 59
End: 2024-02-29
Payer: COMMERCIAL

## 2024-02-29 LAB
ANION GAP SERPL CALC-SCNC: 5 MMOL/L (ref 5–15)
BASOPHILS # BLD: 0.1 K/UL (ref 0–0.1)
BASOPHILS NFR BLD: 1 % (ref 0–1)
BUN SERPL-MCNC: 17 MG/DL (ref 6–20)
BUN/CREAT SERPL: 14 (ref 12–20)
CALCIUM SERPL-MCNC: 7.9 MG/DL (ref 8.5–10.1)
CHLORIDE SERPL-SCNC: 109 MMOL/L (ref 97–108)
CO2 SERPL-SCNC: 21 MMOL/L (ref 21–32)
CREAT SERPL-MCNC: 1.18 MG/DL (ref 0.55–1.02)
DIFFERENTIAL METHOD BLD: ABNORMAL
EOSINOPHIL # BLD: 0.3 K/UL (ref 0–0.4)
EOSINOPHIL NFR BLD: 4 % (ref 0–7)
ERYTHROCYTE [DISTWIDTH] IN BLOOD BY AUTOMATED COUNT: 12.5 % (ref 11.5–14.5)
GLUCOSE SERPL-MCNC: 168 MG/DL (ref 65–100)
HCT VFR BLD AUTO: 32.1 % (ref 35–47)
HGB BLD-MCNC: 10.4 G/DL (ref 11.5–16)
IMM GRANULOCYTES # BLD AUTO: 0 K/UL (ref 0–0.04)
IMM GRANULOCYTES NFR BLD AUTO: 0 % (ref 0–0.5)
LYMPHOCYTES # BLD: 1.4 K/UL (ref 0.8–3.5)
LYMPHOCYTES NFR BLD: 19 % (ref 12–49)
MCH RBC QN AUTO: 28.8 PG (ref 26–34)
MCHC RBC AUTO-ENTMCNC: 32.4 G/DL (ref 30–36.5)
MCV RBC AUTO: 88.9 FL (ref 80–99)
MONOCYTES # BLD: 0.5 K/UL (ref 0–1)
MONOCYTES NFR BLD: 7 % (ref 5–13)
NEUTS SEG # BLD: 5.1 K/UL (ref 1.8–8)
NEUTS SEG NFR BLD: 69 % (ref 32–75)
NRBC # BLD: 0 K/UL (ref 0–0.01)
NRBC BLD-RTO: 0 PER 100 WBC
PLATELET # BLD AUTO: 308 K/UL (ref 150–400)
PMV BLD AUTO: 10.8 FL (ref 8.9–12.9)
POTASSIUM SERPL-SCNC: 4.2 MMOL/L (ref 3.5–5.1)
RBC # BLD AUTO: 3.61 M/UL (ref 3.8–5.2)
SODIUM SERPL-SCNC: 135 MMOL/L (ref 136–145)
WBC # BLD AUTO: 7.3 K/UL (ref 3.6–11)

## 2024-02-29 PROCEDURE — 6360000002 HC RX W HCPCS: Performed by: INTERNAL MEDICINE

## 2024-02-29 PROCEDURE — 1100000003 HC PRIVATE W/ TELEMETRY

## 2024-02-29 PROCEDURE — 6360000002 HC RX W HCPCS: Performed by: STUDENT IN AN ORGANIZED HEALTH CARE EDUCATION/TRAINING PROGRAM

## 2024-02-29 PROCEDURE — 2580000003 HC RX 258: Performed by: INTERNAL MEDICINE

## 2024-02-29 PROCEDURE — 36415 COLL VENOUS BLD VENIPUNCTURE: CPT

## 2024-02-29 PROCEDURE — 85025 COMPLETE CBC W/AUTO DIFF WBC: CPT

## 2024-02-29 PROCEDURE — 2580000003 HC RX 258: Performed by: STUDENT IN AN ORGANIZED HEALTH CARE EDUCATION/TRAINING PROGRAM

## 2024-02-29 PROCEDURE — 80048 BASIC METABOLIC PNL TOTAL CA: CPT

## 2024-02-29 PROCEDURE — 6370000000 HC RX 637 (ALT 250 FOR IP): Performed by: INTERNAL MEDICINE

## 2024-02-29 PROCEDURE — 6360000002 HC RX W HCPCS: Performed by: NURSE PRACTITIONER

## 2024-02-29 PROCEDURE — 71045 X-RAY EXAM CHEST 1 VIEW: CPT

## 2024-02-29 RX ADMIN — SODIUM CHLORIDE, PRESERVATIVE FREE 10 ML: 5 INJECTION INTRAVENOUS at 08:44

## 2024-02-29 RX ADMIN — METRONIDAZOLE 500 MG: 500 INJECTION, SOLUTION INTRAVENOUS at 17:47

## 2024-02-29 RX ADMIN — AMLODIPINE BESYLATE 5 MG: 5 TABLET ORAL at 08:44

## 2024-02-29 RX ADMIN — AZITHROMYCIN MONOHYDRATE 500 MG: 500 INJECTION, POWDER, LYOPHILIZED, FOR SOLUTION INTRAVENOUS at 15:17

## 2024-02-29 RX ADMIN — CLOPIDOGREL BISULFATE 75 MG: 75 TABLET ORAL at 20:34

## 2024-02-29 RX ADMIN — METRONIDAZOLE 500 MG: 500 INJECTION, SOLUTION INTRAVENOUS at 01:26

## 2024-02-29 RX ADMIN — SODIUM CHLORIDE, PRESERVATIVE FREE 10 ML: 5 INJECTION INTRAVENOUS at 20:23

## 2024-02-29 RX ADMIN — ACETAMINOPHEN 650 MG: 325 TABLET ORAL at 20:19

## 2024-02-29 RX ADMIN — METOPROLOL SUCCINATE 25 MG: 25 TABLET, EXTENDED RELEASE ORAL at 08:44

## 2024-02-29 RX ADMIN — LISINOPRIL 5 MG: 5 TABLET ORAL at 08:44

## 2024-02-29 RX ADMIN — ISOSORBIDE MONONITRATE 15 MG: 30 TABLET, EXTENDED RELEASE ORAL at 08:44

## 2024-02-29 RX ADMIN — METRONIDAZOLE 500 MG: 500 INJECTION, SOLUTION INTRAVENOUS at 09:31

## 2024-02-29 RX ADMIN — SODIUM CHLORIDE 1000 MG: 900 INJECTION INTRAVENOUS at 08:43

## 2024-02-29 RX ADMIN — METHIMAZOLE 10 MG: 5 TABLET ORAL at 08:46

## 2024-02-29 RX ADMIN — ASPIRIN 81 MG: 81 TABLET, CHEWABLE ORAL at 08:44

## 2024-02-29 RX ADMIN — ATORVASTATIN CALCIUM 20 MG: 40 TABLET, FILM COATED ORAL at 20:20

## 2024-02-29 NOTE — PLAN OF CARE
Patient is stable. No acute event this shift. In RA. Ambulatory. No active cough noted. Appetite is good. Voiding     Problem: Chronic Conditions and Co-morbidities  Goal: Patient's chronic conditions and co-morbidity symptoms are monitored and maintained or improved  Outcome: Progressing     Problem: Safety - Adult  Goal: Free from fall injury  Outcome: Progressing

## 2024-02-29 NOTE — PROGRESS NOTES
NEW PCP hospital follow-up transitional care appointment has been scheduled with Dr. Magana on 6/25/24 at 1300. Pending patient discharge. Dianna Ray, Care Management Assistant

## 2024-02-29 NOTE — PROGRESS NOTES
Pulmonary, Critical Care, and Sleep Medicine~Consult Note    Name: Skye Krishnan MRN: [id number]   : 1965 Hospital: [facility]   Date: 2024 8:56 AM Admission: 2024     IMPRESSION:   Large left pleural effusion, complicated parapneumonic s/p thoracentesis  1600ml  Diagnosed influenza 2023, CAP 1-2 weeks ago treated w/ doxycycline      PLAN:   Supplemental o2 if needed  Continue abx  F/U blood/pleural fluid cultures, cytology  Monitor for recurrence of effusion - would plan for pleural drain placement if recurrent     Daily Progression:    24 : Tmax 99.7. On RA. Cough dry, some increased. No pain.     24 : Dyspnea much improved. Cough less frequent. Tmax 100.2. On RA. Cardiac MRI negative for thrombus.     59yo female nonsmoker w/ HTN, CAD s/p CABG, stent 2023, hyperthyroidism, DM, CKD. Admitted 24 w/ worsening cough, dyspnea. Dyspnea the last 3 months, progressively worsening. Was diagnosed w/ influenza in November. Cough progressively worsened. Cough minimally productive. Clear sputum. No fevers. Periodic sweats usually when blood sugar low. No increase. ? Weight loss. Lower weight here, about 15lbs but she had not noticed any changes at home. No cough/dyspnea prior to November. Thinks she had CXR at [facility] in , was not told there was any abnormality. [facility]R done at  [facility] a couple of weeks ago, she was told she had pneumonia. She was given doxycycline for about a week. Will try to view images [facility]. Lifelong nonsmoker. No history of asthma. Usual spring allergies. No antihistamine. 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 Lots of fumes. No recommended masks.She feels a clear difference in symptoms when she works on the cutting table. No o2, CPAP, inhalers at home. She has not previously seen a pulmonologist but had scheduled an appt w/   0556  Gross per 24 hour   Intake 802.97 ml   Output --   Net 802.97 ml          Imaging:  I have personally reviewed these radiographic films and reports:     I have personally reviewed PFTs:          Ventilation:   Mode Rate Tidal Volume Pressure Suppport FiO2/LPM PEEP Other               Peak airway pressure:      Minute ventilation:      Trilogy:        Physical Exam:                                        Exam Findings Other   General: No resp distress noted, appears stated age    HEENT:  No ulcers, JVD not elevated, no cervical LAD    Chest: No pectus deformity, normal chest rise b/l    HEART:  RRR, no murmurs/rubs/gallops    Lungs:  diminished BS left    ABD: Soft/NT, non rigid mildly distended    EXT: No cyanosis/clubbing/edema, normal peripheral pulses    Skin: No rashes or ulcers, no mottling    Neuro: A/O x 3        Medications:  Current Facility-Administered Medications   Medication Dose Route Frequency    metroNIDAZOLE (FLAGYL) 500 mg in 0.9% NaCl 100 mL IVPB premix  500 mg IntraVENous Q8H    0.9 % sodium chloride infusion   IntraVENous PRN    gadoteridol (PROHANCE) injection 26 mL  26 mL IntraVENous ONCE PRN    oxyCODONE (ROXICODONE) immediate release tablet 5 mg  5 mg Oral Q4H PRN    sodium chloride flush 0.9 % injection 5-40 mL  5-40 mL IntraVENous 2 times per day    sodium chloride flush 0.9 % injection 5-40 mL  5-40 mL IntraVENous PRN    0.9 % sodium chloride infusion   IntraVENous PRN    ondansetron (ZOFRAN-ODT) disintegrating tablet 4 mg  4 mg Oral Q8H PRN    Or    ondansetron (ZOFRAN) injection 4 mg  4 mg IntraVENous Q6H PRN    polyethylene glycol (GLYCOLAX) packet 17 g  17 g Oral Daily PRN    acetaminophen (TYLENOL) tablet 650 mg  650 mg Oral Q6H PRN    Or    acetaminophen (TYLENOL) suppository 650 mg  650 mg Rectal Q6H PRN    amLODIPine (NORVASC) tablet 5 mg  5 mg Oral Daily    aspirin chewable tablet 81 mg  81 mg Oral Daily    atorvastatin (LIPITOR) tablet 20 mg  20 mg Oral Nightly

## 2024-02-29 NOTE — PROGRESS NOTES
Attempted to schedule hospital follow up for PCP appointment. Sent a message to PCP office to find patient an appointment. Awaiting callback from PCP office. Dianna Ray, Care Management Assistant

## 2024-02-29 NOTE — CARE COORDINATION
Patient is clear from a CM standpoint.    Transition of Care Plan: Home with follow ups.    RUR: 8% (low RUR)  Prior Level of Functioning: Independent  Disposition: Home with follow ups.  If SNF or IPR: Date FOC offered: N/A  Date FOC received: N/A  Accepting facility: N/A  Date authorization started with reference number: N/A  Date authorization received and expires: N/A  Follow up appointments: PCP/specialists if needed.  DME needed: None.  Transportation at discharge: Family.  IM/IMM Medicare/ letter given: N/A Gregorio MELARABS  Is patient a  and connected with VA? No.   If yes, was  transfer form completed and VA notified? N/A  Caregiver Contact: Hakeem arriaga - [phone]  Discharge Caregiver contacted prior to discharge? Patient to contact.  Care Conference needed? No.  Barriers to discharge: pulm clearance, cultures pending    RN notified that patient is clear from a CM standpoint.  SMAART tool left outside door.     02/29/24 1156   Services At/After Discharge   Transition of Care Consult (CM Consult) N/A   Services At/After Discharge None   Mode of Transport at Discharge Other (see comment)  (Family)   Confirm Follow Up Transport Family   Condition of Participation: Discharge Planning   The Plan for Transition of Care is related to the following treatment goals: Return home         ARACELIS Miller, RN    Care Management  [phone]

## 2024-02-29 NOTE — PLAN OF CARE
Problem: Safety - Adult  Goal: Free from fall injury  Outcome: Progressing  Note: Pt has been free from fall injury. Call bell within reach and bed in lowest position w/ wheels locked.

## 2024-02-29 NOTE — PROGRESS NOTES
Physician Progress Note      PATIENT:               TO AVILES  CSN #:                  [id number]  :                       1965  ADMIT DATE:       2024 9:49 AM  DISCH DATE:  RESPONDING  PROVIDER #:        Katiuska Gonsales NP        QUERY TEXT:    Stage of Chronic Kidney Disease: Please provide further specificity, if known.    Clinical indicators include: chronic kidney disease, bun, creatinine, probnp,   brain natriuretic peptide, pro-bnp, ckd, cr, bnp  Options provided:  -- Chronic kidney disease stage 1  -- Chronic kidney disease stage 2  -- Chronic kidney disease stage 3  -- Chronic kidney disease stage 3a  -- Chronic kidney disease stage 3b  -- Chronic kidney disease stage 4  -- Chronic kidney disease stage 5  -- Chronic kidney disease stage 5, requiring dialysis  -- End stage renal disease  -- Other - I will add my own diagnosis  -- Disagree - Not applicable / Not valid  -- Disagree - Clinically Unable to determine / Unknown        PROVIDER RESPONSE TEXT:    Provider was unable to determine a response for this query.  [email or web address]      Electronically signed by:  Katiuska Gonsales NP 2024 11:02 AM

## 2024-02-29 NOTE — PROGRESS NOTES
Hospitalist Progress Note    NAME:   Skye Krishnan   : 1965   MRN: [id number]     Date/Time: 2024 9:09 PM  Patient PCP: None, None    Estimated discharge date:   Barriers: Final cultures, pleural effusion monitoring      Assessment / Plan:    Left moderate-sized pleural effusion  Left midlung patchy airspace disease  CAD  HTN  -3 months history of wheezing/shortness of breath, weight loss of 15 pounds  -proBNP elevated.  WBC normal  -Chest x-ray showed unilateral pleural effusion  -S/p thoracentesis   -Rocephin/azithromycin, will continue until prelim cultures return  -Cardiology was on board for echo with possible LV thrombus- Cardiac MRI negative for thrombus  -Pulmonary on board, expertise appreciated  -Continue aspirin, Plavix, statin, Imdur, lisinopril     ?  Hyperthyroidism  Continue methimazole     Medical Decision Making:   I personally reviewed labs:    I personally reviewed imaging:  I personally reviewed EKG:  Toxic drug monitoring:   Discussed case with:          Code Status: Full   DVT Prophylaxis: Lovenox   GI Prophylaxis:    Subjective:     Chief Complaint / Reason for Physician Visit  Pt with no new complaints. No fever, chills, or increased SOB. Discussed with RN events overnight.       Objective:     VITALS:   Last 24hrs VS reviewed since prior progress note. Most recent are:  Patient Vitals for the past 24 hrs:   BP Temp Temp src Pulse Resp SpO2 Height Weight   24 1922 (!) 130/58 98.5 °F (36.9 °C) Oral 87 17 96 % -- --   24 1613 -- -- -- -- -- -- 1.6 m (5' 2.99\") --   24 1546 (!) 132/54 98.9 °F (37.2 °C) Oral 80 22 -- -- --   24 1545 (!) 131/54 -- -- 80 -- 98 % -- --   24 1115 (!) 132/51 98.3 °F (36.8 °C) Oral 83 20 96 % -- --   24 0715 (!) 139/58 99.3 °F (37.4 °C) Oral 78 16 94 % -- --   24 0627 -- -- -- -- -- -- -- 88.3 kg (194 lb 10.7 oz)   24 0319 135/63 97.8 °F (36.6 °C) Oral 71 18 96 % -- --   24 1326 (!) 121/55  98.4 °F (36.9 °C) Oral 73 18 95 % -- --           Intake/Output Summary (Last 24 hours) at 2/28/2024 2109  Last data filed at 2/28/2024 1624  Gross per 24 hour   Intake 870 ml   Output --   Net 870 ml          I had a face to face encounter and independently examined this patient on 2/28/2024, as outlined below:  PHYSICAL EXAM:  General: Alert, cooperative  EENT:  EOMI. Anicteric sclerae.  Resp:  Coarse breath sounds throughout, decreased air movement left base. No wheezing or rales.  No accessory muscle use  CV:  Regular  rhythm,  No edema  GI:  Soft, Non distended, Non tender.  +Bowel sounds  Neurologic:  Alert and oriented X 3, normal speech,   Psych:   Good insight. Not anxious nor agitated  Skin:  No rashes.  No jaundice    Reviewed most current lab test results and cultures  YES  Reviewed most current radiology test results   YES  Review and summation of old records today    NO  Reviewed patient's current orders and MAR    YES  PMH/SH reviewed - no change compared to H&P    Procedures: see electronic medical records for all procedures/Xrays and details which were not copied into this note but were reviewed prior to creation of Plan.      LABS:  I reviewed today's most current labs and imaging studies.  Pertinent labs include:  Recent Labs     02/26/24  1003   WBC 9.0   HGB 11.0*   HCT 35.0          Recent Labs     02/26/24  1003 02/27/24  0636    138   K 4.3 4.5   * 110*   CO2 23 19*   GLUCOSE 265* 239*   BUN 20 24*   CREATININE 1.31* 1.37*   CALCIUM 8.5 9.0   MG 2.0 2.0   LABALBU 2.4*  --    BILITOT 0.8  --    AST 26  --    ALT 38  --          Signed: Dalia Reinoso MD

## 2024-02-29 NOTE — PROGRESS NOTES
1900: Bedside and Verbal shift change report given to TIFFANY Abbasi (oncoming nurse) by TIFFANY Monk (offgoing nurse). Report included the following information Nurse Handoff Report, Index, Adult Overview, Intake/Output, MAR, Recent Results, and Cardiac Rhythm NSR .     End of Shift Note    Bedside shift change report given to TIFFANY Marx (oncoming nurse) by Rduolph Meza RN (offgoing nurse).  Report included the following information SBAR, Kardex, Intake/Output, MAR, Recent Results, and Cardiac Rhythm NSR    Shift worked:  7p-7:30a     Shift summary and any significant changes:          Concerns for physician to address:       Zone phone for oncoming shift:          Activity:     Number times ambulated in hallways past shift: 0  Number of times OOB to chair past shift: 0    Cardiac:   Cardiac Monitoring: Yes           Access:  Current line(s): PIV     Genitourinary:   Urinary status: voiding and external catheter    Respiratory:      Chronic home O2 use?: NO  Incentive spirometer at bedside: NO       GI:     Current diet:  ADULT DIET; Regular; 3 carb choices (45 gm/meal)  Passing flatus: YES  Tolerating current diet: YES       Pain Management:   Patient states pain is manageable on current regimen: YES    Skin:     Interventions: specialty bed    Patient Safety:  Fall Score:    Interventions: bed/chair alarm, assistive device (walker, cane. etc), gripper socks, pt to call before getting OOB, and stay with me (per policy)       Length of Stay:  Expected LOS: 4  Actual LOS: 3      Rudolph Meza RN

## 2024-03-01 ENCOUNTER — APPOINTMENT (OUTPATIENT)
Facility: HOSPITAL | Age: 59
End: 2024-03-01
Payer: COMMERCIAL

## 2024-03-01 LAB
ANION GAP SERPL CALC-SCNC: 3 MMOL/L (ref 5–15)
BACTERIA SPEC CULT: NORMAL
BACTERIA SPEC CULT: NORMAL
BASOPHILS # BLD: 0 K/UL (ref 0–0.1)
BASOPHILS NFR BLD: 1 % (ref 0–1)
BUN SERPL-MCNC: 17 MG/DL (ref 6–20)
BUN/CREAT SERPL: 15 (ref 12–20)
CALCIUM SERPL-MCNC: 8.4 MG/DL (ref 8.5–10.1)
CHLORIDE SERPL-SCNC: 109 MMOL/L (ref 97–108)
CO2 SERPL-SCNC: 26 MMOL/L (ref 21–32)
CREAT SERPL-MCNC: 1.14 MG/DL (ref 0.55–1.02)
DIFFERENTIAL METHOD BLD: ABNORMAL
EOSINOPHIL # BLD: 0.3 K/UL (ref 0–0.4)
EOSINOPHIL NFR BLD: 6 % (ref 0–7)
ERYTHROCYTE [DISTWIDTH] IN BLOOD BY AUTOMATED COUNT: 12.5 % (ref 11.5–14.5)
GLUCOSE SERPL-MCNC: 153 MG/DL (ref 65–100)
HCT VFR BLD AUTO: 32 % (ref 35–47)
HGB BLD-MCNC: 10.3 G/DL (ref 11.5–16)
IMM GRANULOCYTES # BLD AUTO: 0 K/UL (ref 0–0.04)
IMM GRANULOCYTES NFR BLD AUTO: 0 % (ref 0–0.5)
LYMPHOCYTES # BLD: 1.3 K/UL (ref 0.8–3.5)
LYMPHOCYTES NFR BLD: 23 % (ref 12–49)
MCH RBC QN AUTO: 28.7 PG (ref 26–34)
MCHC RBC AUTO-ENTMCNC: 32.2 G/DL (ref 30–36.5)
MCV RBC AUTO: 89.1 FL (ref 80–99)
MONOCYTES # BLD: 0.7 K/UL (ref 0–1)
MONOCYTES NFR BLD: 12 % (ref 5–13)
NEUTS SEG # BLD: 3.2 K/UL (ref 1.8–8)
NEUTS SEG NFR BLD: 58 % (ref 32–75)
NRBC # BLD: 0 K/UL (ref 0–0.01)
NRBC BLD-RTO: 0 PER 100 WBC
PLATELET # BLD AUTO: 312 K/UL (ref 150–400)
PMV BLD AUTO: 10.3 FL (ref 8.9–12.9)
POTASSIUM SERPL-SCNC: 4.4 MMOL/L (ref 3.5–5.1)
PROCALCITONIN SERPL-MCNC: 0.17 NG/ML
RBC # BLD AUTO: 3.59 M/UL (ref 3.8–5.2)
SERVICE CMNT-IMP: NORMAL
SODIUM SERPL-SCNC: 138 MMOL/L (ref 136–145)
WBC # BLD AUTO: 5.5 K/UL (ref 3.6–11)

## 2024-03-01 PROCEDURE — 2580000003 HC RX 258: Performed by: INTERNAL MEDICINE

## 2024-03-01 PROCEDURE — 2580000003 HC RX 258: Performed by: STUDENT IN AN ORGANIZED HEALTH CARE EDUCATION/TRAINING PROGRAM

## 2024-03-01 PROCEDURE — 80048 BASIC METABOLIC PNL TOTAL CA: CPT

## 2024-03-01 PROCEDURE — 71045 X-RAY EXAM CHEST 1 VIEW: CPT

## 2024-03-01 PROCEDURE — 6360000002 HC RX W HCPCS: Performed by: STUDENT IN AN ORGANIZED HEALTH CARE EDUCATION/TRAINING PROGRAM

## 2024-03-01 PROCEDURE — 36415 COLL VENOUS BLD VENIPUNCTURE: CPT

## 2024-03-01 PROCEDURE — 2580000003 HC RX 258: Performed by: NURSE PRACTITIONER

## 2024-03-01 PROCEDURE — 6370000000 HC RX 637 (ALT 250 FOR IP): Performed by: INTERNAL MEDICINE

## 2024-03-01 PROCEDURE — 6360000002 HC RX W HCPCS: Performed by: NURSE PRACTITIONER

## 2024-03-01 PROCEDURE — 1100000003 HC PRIVATE W/ TELEMETRY

## 2024-03-01 PROCEDURE — 84145 PROCALCITONIN (PCT): CPT

## 2024-03-01 PROCEDURE — 85025 COMPLETE CBC W/AUTO DIFF WBC: CPT

## 2024-03-01 RX ADMIN — LISINOPRIL 5 MG: 5 TABLET ORAL at 09:17

## 2024-03-01 RX ADMIN — SODIUM CHLORIDE, PRESERVATIVE FREE 10 ML: 5 INJECTION INTRAVENOUS at 09:18

## 2024-03-01 RX ADMIN — METHIMAZOLE 10 MG: 5 TABLET ORAL at 09:18

## 2024-03-01 RX ADMIN — CLOPIDOGREL BISULFATE 75 MG: 75 TABLET ORAL at 21:56

## 2024-03-01 RX ADMIN — VANCOMYCIN HYDROCHLORIDE 2000 MG: 10 INJECTION, POWDER, LYOPHILIZED, FOR SOLUTION INTRAVENOUS at 13:01

## 2024-03-01 RX ADMIN — SODIUM CHLORIDE 1000 MG: 900 INJECTION INTRAVENOUS at 09:16

## 2024-03-01 RX ADMIN — METRONIDAZOLE 500 MG: 500 INJECTION, SOLUTION INTRAVENOUS at 10:00

## 2024-03-01 RX ADMIN — METRONIDAZOLE 500 MG: 500 INJECTION, SOLUTION INTRAVENOUS at 17:10

## 2024-03-01 RX ADMIN — SODIUM CHLORIDE, PRESERVATIVE FREE 10 ML: 5 INJECTION INTRAVENOUS at 21:56

## 2024-03-01 RX ADMIN — ISOSORBIDE MONONITRATE 15 MG: 30 TABLET, EXTENDED RELEASE ORAL at 09:17

## 2024-03-01 RX ADMIN — AZITHROMYCIN MONOHYDRATE 500 MG: 500 INJECTION, POWDER, LYOPHILIZED, FOR SOLUTION INTRAVENOUS at 15:49

## 2024-03-01 RX ADMIN — ASPIRIN 81 MG: 81 TABLET, CHEWABLE ORAL at 09:17

## 2024-03-01 RX ADMIN — AMLODIPINE BESYLATE 5 MG: 5 TABLET ORAL at 09:17

## 2024-03-01 RX ADMIN — METOPROLOL SUCCINATE 25 MG: 25 TABLET, EXTENDED RELEASE ORAL at 09:17

## 2024-03-01 RX ADMIN — ATORVASTATIN CALCIUM 20 MG: 40 TABLET, FILM COATED ORAL at 21:56

## 2024-03-01 RX ADMIN — METRONIDAZOLE 500 MG: 500 INJECTION, SOLUTION INTRAVENOUS at 01:38

## 2024-03-01 ASSESSMENT — PAIN SCALES - GENERAL
PAINLEVEL_OUTOF10: 0
PAINLEVEL_OUTOF10: 0

## 2024-03-01 NOTE — PROGRESS NOTES
Pharmacy Antimicrobial Kinetic Dosing    Indication for Antimicrobials: Parapneumonic pleural effusion; HAP     Current Regimen of Each Antimicrobial:  Vancomycin 1500 mg IV q24h; Start Date 3/1; Day 1  Ceftriaxone 1 g IV q24h; Start Date ; Day 5  Azithromycin 500 m IV q24h; Start Date ; Day 5  Metronidazole 500 mg IV q8h; Start Date ; Day 3    Previous Antimicrobial Therapy:     Goal Level: Vancomycin -600    Date Dose & Interval Measured (mcg/mL) Predicted AUC                       Significant Cultures:    pleural: NGTD   blood: NGTD   MRSA: pending    Labs:  Recent Labs     Units 24  0444 24  0356   CREATININE MG/DL 1.14* 1.18*   BUN MG/DL 17 17   WBC K/uL 5.5 7.3     Temp (24hrs), Av.6 °F (37.6 °C), Min:98.3 °F (36.8 °C), Max:102.1 °F (38.9 °C)      Conditions for Dosing Consideration: None    Creatinine Clearance (mL/min): Estimated Creatinine Clearance: 56 mL/min (A) (based on SCr of 1.14 mg/dL (H)).       Impression/Plan:   Ordered vancomycin 2000 mg loading dose, followed by 1500 mg q24h for projected AUC of 490  Can likely DC vancomycin if MRSA screening negative  Continue CTX/azithromycin/Flagyl  Antimicrobial stop date TBD     Pharmacy will follow daily and adjust medications as appropriate for renal function and/or serum levels.    Thank you,  Alexander Heredia [facility]

## 2024-03-01 NOTE — PLAN OF CARE
Problem: Safety - Adult  Goal: Free from fall injury  2/29/2024 2316 by Ondina Chung RN  Outcome: Progressing  Note: Bed is in the lowest position and wheels are locked, call bell is within reach, bathroom light is on during evening hours, gripper socks are on and patient has been instructed to call out for assistance if needed.     As of now, patient is free from falls and will continue to be monitored.

## 2024-03-01 NOTE — PROGRESS NOTES
Hospitalist Progress Note    NAME:   Skye Krishnan   : 1965   MRN: [id number]     Date/Time: 2024 7:36 PM  Patient PCP: None, None    Estimated discharge date:   Barriers: Final cultures, pleural effusion monitoring      Assessment / Plan:    Left moderate-sized pleural effusion  Left midlung patchy airspace disease  CAD  HTN  -3 months history of wheezing/shortness of breath, weight loss of 15 pounds  -proBNP elevated.  WBC normal  -Chest x-ray showed unilateral pleural effusion  -S/p thoracentesis   -Rocephin/azithromycin, will continue until prelim cultures return  -Cardiology was on board for echo with possible LV thrombus- Cardiac MRI negative for thrombus  -Pulmonary on board, expertise appreciated  -Continue aspirin, Plavix, statin, Imdur, lisinopril  -AM CXR, will need 2 weeks abx total     ?  Hyperthyroidism  Continue methimazole     Medical Decision Making:   I personally reviewed labs:    I personally reviewed imaging:  I personally reviewed EKG:  Toxic drug monitoring:   Discussed case with:  -plan for 2 weeks abx with pulm. Await MRSA nares for coverage as cultures have remained negative so far.        Code Status: Full   DVT Prophylaxis: Lovenox   GI Prophylaxis:    Subjective:     Chief Complaint / Reason for Physician Visit  SOB and persistent cough last night.  Discussed with RN events overnight.       Objective:     VITALS:   Last 24hrs VS reviewed since prior progress note. Most recent are:  Patient Vitals for the past 24 hrs:   BP Temp Temp src Pulse Resp SpO2   24 1521 (!) 135/53 100.2 °F (37.9 °C) Oral 90 18 95 %   24 1111 -- 98.5 °F (36.9 °C) Oral -- -- --   24 0730 (!) 147/59 98.5 °F (36.9 °C) Oral 95 18 98 %   24 0346 (!) 141/57 98.6 °F (37 °C) Oral 87 17 95 %   24 2310 -- 99.7 °F (37.6 °C) Oral -- -- 97 %   24 2307 (!) 131/52 99.7 °F (37.6 °C) Oral 87 16 97 %           Intake/Output Summary (Last 24 hours) at 2024

## 2024-03-01 NOTE — PROGRESS NOTES
for the past 24 hrs:   BP Temp Temp src Pulse Resp SpO2   03/01/24 1523 (!) 128/53 99.8 °F (37.7 °C) Oral 74 19 95 %   03/01/24 1113 (!) 121/52 98.6 °F (37 °C) Oral 78 17 96 %   03/01/24 0715 (!) 140/61 98.3 °F (36.8 °C) Oral -- 18 95 %   03/01/24 0306 (!) 142/56 99.4 °F (37.4 °C) Oral 77 16 98 %   02/29/24 2231 (!) 129/46 99.1 °F (37.3 °C) Oral 75 18 95 %   02/29/24 2015 (!) 138/53 (!) 102.1 °F (38.9 °C) Oral 87 17 96 %           Intake/Output Summary (Last 24 hours) at 3/1/2024 1725  Last data filed at 2/29/2024 2046  Gross per 24 hour   Intake 620 ml   Output 950 ml   Net -330 ml          I had a face to face encounter and independently examined this patient on 3/1/2024, as outlined below:  PHYSICAL EXAM:  General: Alert, cooperative  EENT:  EOMI. Anicteric sclerae.  Resp:  Coarse breath sounds throughout, decreased air movement left base. No wheezing or rales.  No accessory muscle use  CV:  Regular  rhythm,  No edema  GI:  Soft, Non distended, Non tender.  +Bowel sounds  Neurologic:  Alert and oriented X 3, normal speech,   Psych:   Good insight. Not anxious nor agitated  Skin:  No rashes.  No jaundice    Reviewed most current lab test results and cultures  YES  Reviewed most current radiology test results   YES  Review and summation of old records today    NO  Reviewed patient's current orders and MAR    YES  PMH/SH reviewed - no change compared to H&P    Procedures: see electronic medical records for all procedures/Xrays and details which were not copied into this note but were reviewed prior to creation of Plan.      LABS:  I reviewed today's most current labs and imaging studies.  Pertinent labs include:  Recent Labs     02/29/24  0356 03/01/24  0444   WBC 7.3 5.5   HGB 10.4* 10.3*   HCT 32.1* 32.0*    312       Recent Labs     02/29/24  0356 03/01/24  0444   * 138   K 4.2 4.4   * 109*   CO2 21 26   GLUCOSE 168* 153*   BUN 17 17   CREATININE 1.18* 1.14*   CALCIUM 7.9* 8.4*         Signed:

## 2024-03-01 NOTE — PROGRESS NOTES
Pulmonary, Critical Care, and Sleep Medicine~Consult Note    Name: Skye Krishnan MRN: [id number]   : 1965 Hospital: [facility]   Date: 3/1/2024 10:10 AM Admission: 2024     IMPRESSION:   Large left pleural effusion, complicated parapneumonic s/p thoracentesis  1600ml  CAP, initially diagnosed 1-2 weeks ago treated outpt w/ doxycycline. Influenza diagnosed 2023       PLAN:   Supplemental o2 if needed  Continue ctx, flagyl. Add vanc.   F/U blood/pleural fluid cultures, cytology  D/W Dr. Jaramillo. Will plan to F/U CXR Monday, consider CT chest  Monitor for recurrence of effusion - would plan for repeat thoracentesis vs pleural drain placement if recurrent  Will see as needed over the weekend, please call w/ any questions. D/W hospitalist     Daily Progression:    3/1/24 : Febrile overnight. 102. Cultures NGSF. On RA. Cough improving. CXR stable.     24 : Tmax 99.7. On RA. Cough dry, some increased. No pain.     24 : Dyspnea much improved. Cough less frequent. Tmax 100.2. On RA. Cardiac MRI negative for thrombus.     59yo female nonsmoker w/ HTN, CAD s/p CABG, stent 2023, hyperthyroidism, DM, CKD. Admitted 24 w/ worsening cough, dyspnea. Dyspnea the last 3 months, progressively worsening. Was diagnosed w/ influenza in November. Cough progressively worsened. Cough minimally productive. Clear sputum. No fevers. Periodic sweats usually when blood sugar low. No increase. ? Weight loss. Lower weight here, about 15lbs but she had not noticed any changes at home. No cough/dyspnea prior to November. Thinks she had CXR at [facility] ED in , was not told there was any abnormality. CXR done at  [facility] a couple of weeks ago, she was told she had pneumonia. She was given doxycycline for about a week. Will try to view images [facility]. Lifelong nonsmoker. No history of asthma. Usual spring allergies. No antihistamine. Makes duct work for heating and     Temp (24hrs), Av.6 °F (37.6 °C), Min:98.3 °F (36.8 °C), Max:102.1 °F (38.9 °C)     Intake/Output:     Last shift: No intake/output data recorded.    Last 3 shifts:  1901 -  0700  In: 1959 [P.O.:560]  Out: 950 [Urine:950]        Intake/Output Summary (Last 24 hours) at 3/1/2024 1010  Last data filed at 2024 2046  Gross per 24 hour   Intake 820 ml   Output 950 ml   Net -130 ml          Imaging:  I have personally reviewed these radiographic films and reports:     I have personally reviewed PFTs:          Ventilation:   Mode Rate Tidal Volume Pressure Suppport FiO2/LPM PEEP Other               Peak airway pressure:      Minute ventilation:      Trilogy:        Physical Exam:                                        Exam Findings Other   General: No resp distress noted, appears stated age    HEENT:  No ulcers, JVD not elevated, no cervical LAD    Chest: No pectus deformity, normal chest rise b/l    HEART:  RRR, no murmurs/rubs/gallops    Lungs:  diminished BS left    ABD: Soft/NT, non rigid mildly distended    EXT: No cyanosis/clubbing/edema, normal peripheral pulses    Skin: No rashes or ulcers, no mottling    Neuro: A/O x 3        Medications:  Current Facility-Administered Medications   Medication Dose Route Frequency    vancomycin (VANCOCIN) 2000 mg in 0.9% sodium chloride 500 mL IVPB  2,000 mg IntraVENous Once    metroNIDAZOLE (FLAGYL) 500 mg in 0.9% NaCl 100 mL IVPB premix  500 mg IntraVENous Q8H    0.9 % sodium chloride infusion   IntraVENous PRN    gadoteridol (PROHANCE) injection 26 mL  26 mL IntraVENous ONCE PRN    oxyCODONE (ROXICODONE) immediate release tablet 5 mg  5 mg Oral Q4H PRN    sodium chloride flush 0.9 % injection 5-40 mL  5-40 mL IntraVENous 2 times per day    sodium chloride flush 0.9 % injection 5-40 mL  5-40 mL IntraVENous PRN    0.9 % sodium chloride infusion   IntraVENous PRN    ondansetron (ZOFRAN-ODT) disintegrating tablet 4 mg  4 mg Oral Q8H PRN    Or    ondansetron

## 2024-03-02 ENCOUNTER — APPOINTMENT (OUTPATIENT)
Facility: HOSPITAL | Age: 59
End: 2024-03-02
Payer: COMMERCIAL

## 2024-03-02 VITALS
HEART RATE: 72 BPM | RESPIRATION RATE: 16 BRPM | TEMPERATURE: 97.5 F | DIASTOLIC BLOOD PRESSURE: 51 MMHG | BODY MASS INDEX: 34.49 KG/M2 | WEIGHT: 194.67 LBS | SYSTOLIC BLOOD PRESSURE: 132 MMHG | OXYGEN SATURATION: 98 % | HEIGHT: 63 IN

## 2024-03-02 LAB
ANION GAP SERPL CALC-SCNC: 6 MMOL/L (ref 5–15)
BACTERIA SPEC CULT: NORMAL
BASOPHILS # BLD: 0.1 K/UL (ref 0–0.1)
BASOPHILS NFR BLD: 1 % (ref 0–1)
BUN SERPL-MCNC: 16 MG/DL (ref 6–20)
BUN/CREAT SERPL: 16 (ref 12–20)
CALCIUM SERPL-MCNC: 7.9 MG/DL (ref 8.5–10.1)
CHLORIDE SERPL-SCNC: 109 MMOL/L (ref 97–108)
CO2 SERPL-SCNC: 22 MMOL/L (ref 21–32)
CREAT SERPL-MCNC: 1.03 MG/DL (ref 0.55–1.02)
DIFFERENTIAL METHOD BLD: ABNORMAL
EOSINOPHIL # BLD: 0.6 K/UL (ref 0–0.4)
EOSINOPHIL NFR BLD: 10 % (ref 0–7)
ERYTHROCYTE [DISTWIDTH] IN BLOOD BY AUTOMATED COUNT: 12.5 % (ref 11.5–14.5)
GLUCOSE SERPL-MCNC: 153 MG/DL (ref 65–100)
GRAM STN SPEC: NORMAL
GRAM STN SPEC: NORMAL
HCT VFR BLD AUTO: 30.1 % (ref 35–47)
HGB BLD-MCNC: 9.6 G/DL (ref 11.5–16)
IMM GRANULOCYTES # BLD AUTO: 0 K/UL (ref 0–0.04)
IMM GRANULOCYTES NFR BLD AUTO: 1 % (ref 0–0.5)
LYMPHOCYTES # BLD: 1.5 K/UL (ref 0.8–3.5)
LYMPHOCYTES NFR BLD: 26 % (ref 12–49)
MCH RBC QN AUTO: 28.7 PG (ref 26–34)
MCHC RBC AUTO-ENTMCNC: 31.9 G/DL (ref 30–36.5)
MCV RBC AUTO: 90.1 FL (ref 80–99)
MONOCYTES # BLD: 0.7 K/UL (ref 0–1)
MONOCYTES NFR BLD: 13 % (ref 5–13)
NEUTS SEG # BLD: 2.8 K/UL (ref 1.8–8)
NEUTS SEG NFR BLD: 49 % (ref 32–75)
NRBC # BLD: 0 K/UL (ref 0–0.01)
NRBC BLD-RTO: 0 PER 100 WBC
PLATELET # BLD AUTO: 316 K/UL (ref 150–400)
PMV BLD AUTO: 10.8 FL (ref 8.9–12.9)
POTASSIUM SERPL-SCNC: 4.9 MMOL/L (ref 3.5–5.1)
RBC # BLD AUTO: 3.34 M/UL (ref 3.8–5.2)
SERVICE CMNT-IMP: NORMAL
SODIUM SERPL-SCNC: 137 MMOL/L (ref 136–145)
WBC # BLD AUTO: 5.7 K/UL (ref 3.6–11)

## 2024-03-02 PROCEDURE — 71045 X-RAY EXAM CHEST 1 VIEW: CPT

## 2024-03-02 PROCEDURE — 36415 COLL VENOUS BLD VENIPUNCTURE: CPT

## 2024-03-02 PROCEDURE — 2580000003 HC RX 258: Performed by: NURSE PRACTITIONER

## 2024-03-02 PROCEDURE — 6360000002 HC RX W HCPCS: Performed by: NURSE PRACTITIONER

## 2024-03-02 PROCEDURE — 80048 BASIC METABOLIC PNL TOTAL CA: CPT

## 2024-03-02 PROCEDURE — 2580000003 HC RX 258: Performed by: INTERNAL MEDICINE

## 2024-03-02 PROCEDURE — 6370000000 HC RX 637 (ALT 250 FOR IP): Performed by: INTERNAL MEDICINE

## 2024-03-02 PROCEDURE — 85025 COMPLETE CBC W/AUTO DIFF WBC: CPT

## 2024-03-02 RX ORDER — AMOXICILLIN AND CLAVULANATE POTASSIUM 875; 125 MG/1; MG/1
1 TABLET, FILM COATED ORAL 2 TIMES DAILY
Qty: 28 TABLET | Refills: 0 | Status: SHIPPED | OUTPATIENT
Start: 2024-03-02 | End: 2024-03-16

## 2024-03-02 RX ADMIN — AMLODIPINE BESYLATE 5 MG: 5 TABLET ORAL at 09:27

## 2024-03-02 RX ADMIN — LISINOPRIL 5 MG: 5 TABLET ORAL at 09:26

## 2024-03-02 RX ADMIN — METRONIDAZOLE 500 MG: 500 INJECTION, SOLUTION INTRAVENOUS at 01:38

## 2024-03-02 RX ADMIN — SODIUM CHLORIDE, PRESERVATIVE FREE 10 ML: 5 INJECTION INTRAVENOUS at 09:27

## 2024-03-02 RX ADMIN — SODIUM CHLORIDE 1000 MG: 900 INJECTION INTRAVENOUS at 09:34

## 2024-03-02 RX ADMIN — ASPIRIN 81 MG: 81 TABLET, CHEWABLE ORAL at 09:25

## 2024-03-02 RX ADMIN — METOPROLOL SUCCINATE 25 MG: 25 TABLET, EXTENDED RELEASE ORAL at 09:26

## 2024-03-02 RX ADMIN — ISOSORBIDE MONONITRATE 15 MG: 30 TABLET, EXTENDED RELEASE ORAL at 09:26

## 2024-03-02 ASSESSMENT — PAIN SCALES - GENERAL: PAINLEVEL_OUTOF10: 0

## 2024-03-02 NOTE — SIGNIFICANT EVENT
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 Please defer to pulmonologist thereafter.         Sincerely,          Dalia Reinoso MD

## 2024-03-02 NOTE — PROGRESS NOTES
Pharmacy Antimicrobial Kinetic Dosing    Indication for Antimicrobials: Parapneumonic pleural effusion; HAP     Current Regimen of Each Antimicrobial:  Vancomycin 1500 mg IV q24h; Start Date 3/1; Day 2  Ceftriaxone 1 g IV q24h; Start Date ; Day 6  Azithromycin 500 m IV q24h; Start Date ; Day 6  Metronidazole 500 mg IV q8h; Start Date ; Day 4    Previous Antimicrobial Therapy:     Goal Level: Vancomycin -600    Date Dose & Interval Measured (mcg/mL) Predicted AUC                       Significant Cultures:    pleural: NGTD   blood: NGTD   MRSA: pending    Labs:  Recent Labs     Units 24  0327 24  1214 24  0444 24  0356   CREATININE MG/DL 1.03*  --  1.14* 1.18*   BUN MG/DL 16  --  17 17   PROCAL ng/mL  --  0.17  --   --    WBC K/uL 5.7  --  5.5 7.3     Temp (24hrs), Av.5 °F (36.9 °C), Min:97.5 °F (36.4 °C), Max:99.8 °F (37.7 °C)      Conditions for Dosing Consideration: None    Creatinine Clearance (mL/min): Estimated Creatinine Clearance: 62 mL/min (A) (based on SCr of 1.03 mg/dL (H)).       Impression/Plan:   Ordered vancomycin 2000 mg loading dose, followed by 1500 mg q24h for projected AUC of 490  Patient to discharge 3/2 on augmentin  Can likely DC vancomycin if MRSA screening negative--MRSA negative  Continue CTX/azithromycin/Flagyl  Antimicrobial stop date TBD     Pharmacy will follow daily and adjust medications as appropriate for renal function and/or serum levels.    Thank you,  Carlitos Morillo RPH

## 2024-03-02 NOTE — PLAN OF CARE
Problem: Discharge Planning  Goal: Discharge to home or other facility with appropriate resources  Outcome: Adequate for Discharge     Problem: Chronic Conditions and Co-morbidities  Goal: Patient's chronic conditions and co-morbidity symptoms are monitored and maintained or improved  3/2/2024 1104 by Keshia Marie, RN  Outcome: Adequate for Discharge  3/2/2024 1104 by Keshia Marie, RN  Outcome: Progressing     Problem: Safety - Adult  Goal: Free from fall injury  Outcome: Adequate for Discharge

## 2024-03-02 NOTE — DISCHARGE SUMMARY
and examined by me on discharge day.  Pertinent Findings:  Patient Vitals for the past 24 hrs:   BP Temp Temp src Pulse Resp SpO2   03/02/24 0815 -- 97.5 °F (36.4 °C) Oral -- 16 --   03/02/24 0325 (!) 132/51 98.2 °F (36.8 °C) Oral 72 18 98 %   03/01/24 2335 (!) 128/51 98.3 °F (36.8 °C) Oral 73 19 95 %   03/01/24 1523 (!) 128/53 99.8 °F (37.7 °C) Oral 74 19 95 %       Gen:    Not in distress  Chest: Clear lungs  CVS:   Regular rhythm.  No edema  Abd:  Soft, not distended, not tender  Neuro: awake, moving all exts    Discharge/Recent Laboratory Results:  Recent Labs     03/02/24 0327      K 4.9   *   CO2 22   BUN 16   CREATININE 1.03*   GLUCOSE 153*   CALCIUM 7.9*     Recent Labs     03/02/24 0327   HGB 9.6*   HCT 30.1*   WBC 5.7          Discharge Medications:     Medication List        START taking these medications      amoxicillin-clavulanate 875-125 MG per tablet  Commonly known as: AUGMENTIN  Take 1 tablet by mouth 2 times daily for 14 days            CONTINUE taking these medications      amLODIPine 5 MG tablet  Commonly known as: NORVASC     aspirin 325 MG tablet     atorvastatin 20 MG tablet  Commonly known as: LIPITOR     clopidogrel 75 MG tablet  Commonly known as: Plavix  Take 1 tablet by mouth daily     insulin lispro 100 UNIT/ML Soln injection vial  Commonly known as: HUMALOG     isosorbide mononitrate 30 MG extended release tablet  Commonly known as: IMDUR     lisinopril 5 MG tablet  Commonly known as: PRINIVIL;ZESTRIL     methIMAzole 10 MG tablet  Commonly known as: TAPAZOLE     metoprolol succinate 25 MG extended release tablet  Commonly known as: TOPROL XL     vitamin D 25 MCG (1000 UT) Caps               Where to Get Your Medications        These medications were sent to [id number] - [place], [place] - [address] - P [phone] - F [phone] [address] [address]      Phone: [phone]   amoxicillin-clavulanate 875-125 MG per tablet

## 2024-03-02 NOTE — DISCHARGE INSTRUCTIONS
All of your medications from before your hospitalization are the same EXCEPT:  Your new prescription for you to start is augmentin for pneumonia.  Please take all of your medications as prescribed. If prescribed any medications, please read all pharmacy instructions and inserts. Inform your doctor and pharmacist about all other medications and alternative therapies.  Please follow up with your PCP in 1-2 weeks to be reassessed after your hospital stay.  Please also follow up with pulmonology as scheduled.  If you start feeling any symptoms similar to what brought you into the hospital, please come back to the ED to be re-evaluated.

## 2024-03-02 NOTE — CARE COORDINATION
Transition of Care Plan:    RUR: 8%-\"Low Risk\"  Prior Level of Functioning: Independent in her ADLs and IADLs  Disposition: Home with follow-up appts   If SNF or IPR: Date FOC offered: N/A  Follow up appointments: PCP & Specialists as indicated   DME needed: N/A  Transportation at discharge: The patient's significant other will be transporting her home  IM/IMM Medicare/ letter given: N/A  Is patient a  and connected with [facility]? N/A   If yes, was [place] transfer form completed and [facility] notified?   Caregiver Contact: Hakeem Jackson, Significant Other, Phone; [phone]  Discharge Caregiver contacted prior to discharge? CM will contact her significant other if she requests this   Care Conference needed? No  Barriers to discharge: N/A    CM was alerted that the patient is being discharged today, 3/2/24. The patient has no questions/concerns for discharge. Her significant other will be transporting her home. 2nd IM letter not needed.     From CM perspective, the patient can be discharged.     Justina Danielle, LCSW  [id number]

## 2024-03-02 NOTE — PROGRESS NOTES
End of Shift Note    Bedside shift change report given to TIFFANY Schmitt (oncoming nurse) by Venessa Crystal RN (offgoing nurse).  Report included the following information SBAR, Kardex, Procedure Summary, Intake/Output, MAR, Recent Results, Cardiac Rhythm SR, and Quality Measures    Shift worked:       Shift summary and any significant changes:     Temp wnl 98.2. no major events last night     Concerns for physician to address:       Zone phone for oncoming shift:          Activity:     Number times ambulated in hallways past shift: 0  Number of times OOB to chair past shift: 2    Cardiac:   Cardiac Monitoring: Yes           Access:  Current line(s): PIV     Genitourinary:   Urinary status: voiding    Respiratory:      Chronic home O2 use?: NO  Incentive spirometer at bedside: YES       GI:     Current diet:  ADULT DIET; Regular; 3 carb choices (45 gm/meal)  Passing flatus: YES  Tolerating current diet: YES       Pain Management:   Patient states pain is manageable on current regimen: YES    Skin:     Interventions: turn team, float heels, increase time out of bed, limit briefs, and internal/external urinary devices    Patient Safety:  Fall Score:    Interventions: bed/chair alarm, gripper socks, and pt to call before getting OOB       Length of Stay:  Expected LOS: 5  Actual LOS: 5      Venessa Crystal RN

## 2024-03-03 LAB
BACTERIA SPEC CULT: NORMAL
BACTERIA SPEC CULT: NORMAL
SERVICE CMNT-IMP: NORMAL
SERVICE CMNT-IMP: NORMAL

## 2024-03-27 ENCOUNTER — TRANSCRIBE ORDERS (OUTPATIENT)
Facility: HOSPITAL | Age: 59
End: 2024-03-27

## 2024-03-27 ENCOUNTER — HOSPITAL ENCOUNTER (OUTPATIENT)
Facility: HOSPITAL | Age: 59
Discharge: HOME | End: 2024-03-30
Payer: COMMERCIAL

## 2024-03-27 DIAGNOSIS — J18.9 PNEUMONIA DUE TO INFECTIOUS ORGANISM, UNSPECIFIED LATERALITY, UNSPECIFIED PART OF LUNG: ICD-10-CM

## 2024-03-27 DIAGNOSIS — J18.9 PNEUMONIA DUE TO INFECTIOUS ORGANISM, UNSPECIFIED LATERALITY, UNSPECIFIED PART OF LUNG: Primary | ICD-10-CM

## 2024-03-27 PROCEDURE — 71046 X-RAY EXAM CHEST 2 VIEWS: CPT

## 2024-04-18 LAB
ESTIMATED AVERAGE GLUCOSE: NORMAL
HBA1C MFR BLD: 7.6 %

## 2024-06-23 SDOH — HEALTH STABILITY: PHYSICAL HEALTH: ON AVERAGE, HOW MANY MINUTES DO YOU ENGAGE IN EXERCISE AT THIS LEVEL?: 60 MIN

## 2024-06-23 SDOH — HEALTH STABILITY: PHYSICAL HEALTH: ON AVERAGE, HOW MANY DAYS PER WEEK DO YOU ENGAGE IN MODERATE TO STRENUOUS EXERCISE (LIKE A BRISK WALK)?: 2 DAYS

## 2024-06-25 ENCOUNTER — OFFICE VISIT (OUTPATIENT)
Age: 59
End: 2024-06-25
Payer: COMMERCIAL

## 2024-06-25 VITALS
HEART RATE: 67 BPM | TEMPERATURE: 98.8 F | WEIGHT: 193 LBS | RESPIRATION RATE: 20 BRPM | BODY MASS INDEX: 32.95 KG/M2 | HEIGHT: 64 IN | SYSTOLIC BLOOD PRESSURE: 98 MMHG | DIASTOLIC BLOOD PRESSURE: 63 MMHG | OXYGEN SATURATION: 97 %

## 2024-06-25 DIAGNOSIS — Z11.59 NEED FOR HEPATITIS C SCREENING TEST: ICD-10-CM

## 2024-06-25 DIAGNOSIS — Z23 IMMUNIZATION DUE: ICD-10-CM

## 2024-06-25 DIAGNOSIS — I25.10 ATHEROSCLEROSIS OF NATIVE CORONARY ARTERY OF NATIVE HEART WITHOUT ANGINA PECTORIS: ICD-10-CM

## 2024-06-25 DIAGNOSIS — Z12.11 ENCOUNTER FOR SCREENING FOR MALIGNANT NEOPLASM OF COLON: ICD-10-CM

## 2024-06-25 DIAGNOSIS — D64.9 ANEMIA, UNSPECIFIED TYPE: ICD-10-CM

## 2024-06-25 DIAGNOSIS — Z00.00 HEALTHCARE MAINTENANCE: ICD-10-CM

## 2024-06-25 DIAGNOSIS — E10.9 TYPE 1 DIABETES MELLITUS WITHOUT COMPLICATION (HCC): Primary | ICD-10-CM

## 2024-06-25 DIAGNOSIS — E78.2 MIXED HYPERLIPIDEMIA: ICD-10-CM

## 2024-06-25 PROCEDURE — 99204 OFFICE O/P NEW MOD 45 MIN: CPT | Performed by: STUDENT IN AN ORGANIZED HEALTH CARE EDUCATION/TRAINING PROGRAM

## 2024-06-25 PROCEDURE — 3051F HG A1C>EQUAL 7.0%<8.0%: CPT | Performed by: STUDENT IN AN ORGANIZED HEALTH CARE EDUCATION/TRAINING PROGRAM

## 2024-06-25 PROCEDURE — 90715 TDAP VACCINE 7 YRS/> IM: CPT | Performed by: STUDENT IN AN ORGANIZED HEALTH CARE EDUCATION/TRAINING PROGRAM

## 2024-06-25 PROCEDURE — 90471 IMMUNIZATION ADMIN: CPT | Performed by: STUDENT IN AN ORGANIZED HEALTH CARE EDUCATION/TRAINING PROGRAM

## 2024-06-25 PROCEDURE — 3078F DIAST BP <80 MM HG: CPT | Performed by: STUDENT IN AN ORGANIZED HEALTH CARE EDUCATION/TRAINING PROGRAM

## 2024-06-25 PROCEDURE — 3074F SYST BP LT 130 MM HG: CPT | Performed by: STUDENT IN AN ORGANIZED HEALTH CARE EDUCATION/TRAINING PROGRAM

## 2024-06-25 SDOH — ECONOMIC STABILITY: FOOD INSECURITY: WITHIN THE PAST 12 MONTHS, YOU WORRIED THAT YOUR FOOD WOULD RUN OUT BEFORE YOU GOT MONEY TO BUY MORE.: NEVER TRUE

## 2024-06-25 SDOH — ECONOMIC STABILITY: INCOME INSECURITY: HOW HARD IS IT FOR YOU TO PAY FOR THE VERY BASICS LIKE FOOD, HOUSING, MEDICAL CARE, AND HEATING?: NOT HARD AT ALL

## 2024-06-25 SDOH — ECONOMIC STABILITY: HOUSING INSECURITY
IN THE LAST 12 MONTHS, WAS THERE A TIME WHEN YOU DID NOT HAVE A STEADY PLACE TO SLEEP OR SLEPT IN A SHELTER (INCLUDING NOW)?: NO

## 2024-06-25 SDOH — ECONOMIC STABILITY: FOOD INSECURITY: WITHIN THE PAST 12 MONTHS, THE FOOD YOU BOUGHT JUST DIDN'T LAST AND YOU DIDN'T HAVE MONEY TO GET MORE.: NEVER TRUE

## 2024-06-25 ASSESSMENT — PATIENT HEALTH QUESTIONNAIRE - PHQ9
SUM OF ALL RESPONSES TO PHQ QUESTIONS 1-9: 0
1. LITTLE INTEREST OR PLEASURE IN DOING THINGS: NOT AT ALL
SUM OF ALL RESPONSES TO PHQ QUESTIONS 1-9: 0
SUM OF ALL RESPONSES TO PHQ QUESTIONS 1-9: 0
SUM OF ALL RESPONSES TO PHQ9 QUESTIONS 1 & 2: 0
SUM OF ALL RESPONSES TO PHQ QUESTIONS 1-9: 0
2. FEELING DOWN, DEPRESSED OR HOPELESS: NOT AT ALL

## 2024-06-25 NOTE — PROGRESS NOTES
Skye Krishnan (:  1965) is a 58 y.o. female,  with  has a past medical history of Chronic kidney disease, Coronary atherosclerosis of native coronary artery, Coronary atherosclerosis of native coronary artery, Diabetes (HCC), Essential hypertension, Essential hypertension, benign, Heart failure (HCC), Hyperlipidemia, Hypertension, Hypothyroidism, Morbid obesity (HCC), Nausea & vomiting, and Thyroid disease. is here for evaluation of the following chief complaint(s):  Annual Exam and New Patient       Assessment & Plan      1. Type 1 diabetes mellitus without complication (HCC)  Assessment & Plan:   Monitored by specialist- no acute findings meriting change in the plan  2. Need for hepatitis C screening test  -     Hepatitis C Antibody; Future  3. Anemia, unspecified type  Assessment & Plan:   Unclear control,   Orders:  -     CBC with Auto Differential; Future  -     Iron and TIBC; Future  -     Ferritin; Future  4. Encounter for screening for malignant neoplasm of colon  -     Cologuard (Fecal DNA Colorectal Cancer Screening)  5. Immunization due  -     Tdap, BOOSTRIX, (age 10 yrs+), IM  6. Mixed hyperlipidemia  7. Atherosclerosis of native coronary artery of native heart without angina pectoris  Assessment & Plan:   Monitored by specialist- no acute findings meriting change in the plan  8. Healthcare maintenance  Assessment & Plan:  The patient's blood pressure is well-regulated. A Cologuard test will be ordered. The pneumonia vaccine will be administered today. Information regarding the Prevnar 20 vaccine will be provided. A mammogram will be ordered. Hepatitis C screening will be ordered. A tetanus vaccine will be administered today.          Subjective   History of Present Illness  The patient presents for establish of care. Pt states that other doctors have told her she needs to have a PCP. She doesn't see the point and unenthusiastic about coming to the office today.     The patient reports overall

## 2024-06-26 NOTE — ASSESSMENT & PLAN NOTE
The patient's blood pressure is well-regulated. A Cologuard test will be ordered. The pneumonia vaccine will be administered today. Information regarding the Prevnar 20 vaccine will be provided. A mammogram will be ordered. Hepatitis C screening will be ordered. A tetanus vaccine will be administered today.

## 2024-06-26 NOTE — ASSESSMENT & PLAN NOTE
Monitored by specialist- no acute findings meriting change in the plan   Doxycycline Pregnancy And Lactation Text: This medication is Pregnancy Category D and not consider safe during pregnancy. It is also excreted in breast milk but is considered safe for shorter treatment courses.

## 2024-07-10 LAB — NONINV COLON CA DNA+OCC BLD SCRN STL QL: POSITIVE

## 2024-07-11 ENCOUNTER — TELEPHONE (OUTPATIENT)
Age: 59
End: 2024-07-11

## 2024-07-12 ENCOUNTER — TRANSCRIBE ORDERS (OUTPATIENT)
Facility: HOSPITAL | Age: 59
End: 2024-07-12

## 2024-07-12 ENCOUNTER — HOSPITAL ENCOUNTER (OUTPATIENT)
Facility: HOSPITAL | Age: 59
Discharge: HOME | End: 2024-07-12
Payer: COMMERCIAL

## 2024-07-12 DIAGNOSIS — J90 PLEURAL EFFUSION: Primary | ICD-10-CM

## 2024-07-12 DIAGNOSIS — J90 PLEURAL EFFUSION: ICD-10-CM

## 2024-07-12 PROCEDURE — 71046 X-RAY EXAM CHEST 2 VIEWS: CPT

## 2024-07-19 ENCOUNTER — TELEPHONE (OUTPATIENT)
Age: 59
End: 2024-07-19

## 2024-07-19 NOTE — TELEPHONE ENCOUNTER
Patient notified of results and response from Rony Magana MD    States understanding and appointment is scheduled for colonoscopy.      Per Dr. Magana  The cologuard test was positive. I placed a referral to Dr. Burks to discuss colonoscopy. Call [phone] to set up the appointment

## 2024-07-22 ENCOUNTER — TELEPHONE (OUTPATIENT)
Age: 59
End: 2024-07-22

## 2024-07-22 NOTE — TELEPHONE ENCOUNTER
Called, no answer left message to call office back.      The cologuard test was positive.   I placed a referral to Dr. Burks to discuss colonoscopy.   Call [phone] to set up the appointment.

## 2024-07-25 PROBLEM — Z00.00 HEALTHCARE MAINTENANCE: Status: RESOLVED | Noted: 2024-06-25 | Resolved: 2024-07-25

## 2024-07-25 LAB
BASOPHILS # BLD AUTO: 0.1 X10E3/UL (ref 0–0.2)
BASOPHILS NFR BLD AUTO: 1 %
EOSINOPHIL # BLD AUTO: 0.2 X10E3/UL (ref 0–0.4)
EOSINOPHIL NFR BLD AUTO: 3 %
ERYTHROCYTE [DISTWIDTH] IN BLOOD BY AUTOMATED COUNT: 13.6 % (ref 11.7–15.4)
HCT VFR BLD AUTO: 40 % (ref 34–46.6)
HGB BLD-MCNC: 13.2 G/DL (ref 11.1–15.9)
IMM GRANULOCYTES # BLD AUTO: 0 X10E3/UL (ref 0–0.1)
IMM GRANULOCYTES NFR BLD AUTO: 0 %
LYMPHOCYTES # BLD AUTO: 1.9 X10E3/UL (ref 0.7–3.1)
LYMPHOCYTES NFR BLD AUTO: 27 %
MCH RBC QN AUTO: 29.1 PG (ref 26.6–33)
MCHC RBC AUTO-ENTMCNC: 33 G/DL (ref 31.5–35.7)
MCV RBC AUTO: 88 FL (ref 79–97)
MONOCYTES # BLD AUTO: 0.5 X10E3/UL (ref 0.1–0.9)
MONOCYTES NFR BLD AUTO: 8 %
NEUTROPHILS # BLD AUTO: 4.3 X10E3/UL (ref 1.4–7)
NEUTROPHILS NFR BLD AUTO: 61 %
PLATELET # BLD AUTO: 245 X10E3/UL (ref 150–450)
RBC # BLD AUTO: 4.54 X10E6/UL (ref 3.77–5.28)
WBC # BLD AUTO: 6.9 X10E3/UL (ref 3.4–10.8)

## 2024-07-26 LAB
FERRITIN SERPL-MCNC: 90 NG/ML (ref 15–150)
HCV IGG SERPL QL IA: NON REACTIVE
IRON SATN MFR SERPL: 29 % (ref 15–55)
IRON SERPL-MCNC: 91 UG/DL (ref 27–159)
TIBC SERPL-MCNC: 310 UG/DL (ref 250–450)
UIBC SERPL-MCNC: 219 UG/DL (ref 131–425)

## (undated) DEVICE — Device: Brand: PROWATER

## (undated) DEVICE — GUIDEWIRE VASC L260CM 0.035IN J TIP L3MM PTFE FIX COR NAMIC

## (undated) DEVICE — CATHETER ANGIO JL3.5 AD 5 FRX100 CM PERFORMA

## (undated) DEVICE — TUBING PRSS MON L6IN PVC M FEM CONN

## (undated) DEVICE — CATHETER GUID JR4 0.070 INX6 FRX100 CM VISTA BRT TIP ADROIT

## (undated) DEVICE — HI-TORQUE VERSACORE FLOPPY GUIDE WIRE SYSTEM 145 CM: Brand: HI-TORQUE VERSACORE

## (undated) DEVICE — 3M™ TEGADERM™ TRANSPARENT FILM DRESSING FRAME STYLE, 1626W, 4 IN X 4-3/4 IN (10 CM X 12 CM), 50/CT 4CT/CASE: Brand: 3M™ TEGADERM™

## (undated) DEVICE — CATHETER DIAG 5FR L100CM SPEC IMA CRV SZ DBL BRAID WIRE SFT

## (undated) DEVICE — CATH BLLN ANGIO 2X20MM SC EUPHORA RX

## (undated) DEVICE — CATHETER GUID 5FR L100CM DIA0.058IN NYL SHFT JR4.0 W/O SIDE

## (undated) DEVICE — SYRINGE ANGIO 10 CC BRL STD PRNT POLYCARB LT BLU MEDALLION

## (undated) DEVICE — CATHETER GUID AL1 5 FRX100 CM SM ATRAUM SFT CONVEY

## (undated) DEVICE — GLIDESHEATH SLENDER ACCESS KIT: Brand: GLIDESHEATH SLENDER

## (undated) DEVICE — Device

## (undated) DEVICE — CATHETER ANGIO JR4 AD 5 FRX100 CM 25 CM PERFORMA

## (undated) DEVICE — SPLINT WR VELC FOAM NEUT POS DISP FOR RAD ART ACC SFT STRP

## (undated) DEVICE — Device: Brand: ASAHI SION BLUE

## (undated) DEVICE — BAND COMPR L24CM REG CLR PLAS HEMSTAT EXT HK AND LOOP RETEN